# Patient Record
Sex: MALE | Race: BLACK OR AFRICAN AMERICAN | NOT HISPANIC OR LATINO | Employment: OTHER | URBAN - METROPOLITAN AREA
[De-identification: names, ages, dates, MRNs, and addresses within clinical notes are randomized per-mention and may not be internally consistent; named-entity substitution may affect disease eponyms.]

---

## 2019-08-11 ENCOUNTER — HOSPITAL ENCOUNTER (INPATIENT)
Facility: CLINIC | Age: 24
LOS: 2 days | Discharge: HOME | DRG: 885 | End: 2019-08-13
Attending: EMERGENCY MEDICINE | Admitting: PSYCHIATRY & NEUROLOGY
Payer: MEDICARE

## 2019-08-11 DIAGNOSIS — F12.10 MARIJUANA ABUSE: ICD-10-CM

## 2019-08-11 DIAGNOSIS — F25.0 SCHIZOAFFECTIVE DISORDER, BIPOLAR TYPE (H): ICD-10-CM

## 2019-08-11 DIAGNOSIS — F90.9 ATTENTION DEFICIT HYPERACTIVITY DISORDER (ADHD), UNSPECIFIED ADHD TYPE: ICD-10-CM

## 2019-08-11 DIAGNOSIS — R45.851 SUICIDAL IDEATION: ICD-10-CM

## 2019-08-11 DIAGNOSIS — F39 MILD MOOD DISORDER (H): ICD-10-CM

## 2019-08-11 DIAGNOSIS — F42.2 MIXED OBSESSIONAL THOUGHTS AND ACTS: ICD-10-CM

## 2019-08-11 DIAGNOSIS — F10.10 ALCOHOL ABUSE, CONTINUOUS: ICD-10-CM

## 2019-08-11 DIAGNOSIS — F29 ATYPICAL PSYCHOSIS (H): ICD-10-CM

## 2019-08-11 LAB
AMPHETAMINES UR QL SCN: NEGATIVE
BARBITURATES UR QL: NEGATIVE
BENZODIAZ UR QL: NEGATIVE
CANNABINOIDS UR QL SCN: POSITIVE
COCAINE UR QL: NEGATIVE
ETHANOL UR QL SCN: NEGATIVE
OPIATES UR QL SCN: NEGATIVE

## 2019-08-11 PROCEDURE — 99285 EMERGENCY DEPT VISIT HI MDM: CPT | Mod: Z6 | Performed by: EMERGENCY MEDICINE

## 2019-08-11 PROCEDURE — 99285 EMERGENCY DEPT VISIT HI MDM: CPT | Mod: 25 | Performed by: EMERGENCY MEDICINE

## 2019-08-11 PROCEDURE — 90791 PSYCH DIAGNOSTIC EVALUATION: CPT

## 2019-08-11 PROCEDURE — 80307 DRUG TEST PRSMV CHEM ANLYZR: CPT | Performed by: EMERGENCY MEDICINE

## 2019-08-11 PROCEDURE — 25000132 ZZH RX MED GY IP 250 OP 250 PS 637: Mod: GY | Performed by: NURSE PRACTITIONER

## 2019-08-11 PROCEDURE — 12400001 ZZH R&B MH UMMC

## 2019-08-11 PROCEDURE — 80320 DRUG SCREEN QUANTALCOHOLS: CPT | Performed by: EMERGENCY MEDICINE

## 2019-08-11 RX ORDER — OLANZAPINE 5 MG/1
5-10 TABLET ORAL 4 TIMES DAILY PRN
Status: DISCONTINUED | OUTPATIENT
Start: 2019-08-11 | End: 2019-08-13 | Stop reason: HOSPADM

## 2019-08-11 RX ORDER — ESCITALOPRAM OXALATE 20 MG/1
20 TABLET ORAL DAILY
Status: DISCONTINUED | OUTPATIENT
Start: 2019-08-12 | End: 2019-08-13 | Stop reason: HOSPADM

## 2019-08-11 RX ORDER — HYDROXYZINE HYDROCHLORIDE 25 MG/1
25-50 TABLET, FILM COATED ORAL EVERY 4 HOURS PRN
Status: DISCONTINUED | OUTPATIENT
Start: 2019-08-11 | End: 2019-08-13 | Stop reason: HOSPADM

## 2019-08-11 RX ORDER — ALUMINA, MAGNESIA, AND SIMETHICONE 2400; 2400; 240 MG/30ML; MG/30ML; MG/30ML
30 SUSPENSION ORAL EVERY 4 HOURS PRN
Status: DISCONTINUED | OUTPATIENT
Start: 2019-08-11 | End: 2019-08-13 | Stop reason: HOSPADM

## 2019-08-11 RX ORDER — LITHIUM CARBONATE 300 MG
900 TABLET ORAL AT BEDTIME
Status: ON HOLD | COMMUNITY
End: 2019-08-13

## 2019-08-11 RX ORDER — BENZTROPINE MESYLATE 0.5 MG/1
0.5 TABLET ORAL
COMMUNITY
End: 2019-08-20

## 2019-08-11 RX ORDER — BISACODYL 10 MG
10 SUPPOSITORY, RECTAL RECTAL DAILY PRN
Status: DISCONTINUED | OUTPATIENT
Start: 2019-08-11 | End: 2019-08-13 | Stop reason: HOSPADM

## 2019-08-11 RX ORDER — RISPERIDONE 3 MG/1
6 TABLET ORAL AT BEDTIME
COMMUNITY
End: 2024-01-06

## 2019-08-11 RX ORDER — BENZTROPINE MESYLATE 0.5 MG/1
0.5 TABLET ORAL 2 TIMES DAILY PRN
Status: DISCONTINUED | OUTPATIENT
Start: 2019-08-11 | End: 2019-08-13 | Stop reason: HOSPADM

## 2019-08-11 RX ORDER — ACETAMINOPHEN 325 MG/1
650 TABLET ORAL EVERY 4 HOURS PRN
Status: DISCONTINUED | OUTPATIENT
Start: 2019-08-11 | End: 2019-08-13 | Stop reason: HOSPADM

## 2019-08-11 RX ORDER — ESCITALOPRAM OXALATE 20 MG/1
20 TABLET ORAL DAILY
COMMUNITY
End: 2024-01-06

## 2019-08-11 RX ORDER — OLANZAPINE 2.5 MG/1
TABLET, FILM COATED ORAL
COMMUNITY
End: 2024-01-06

## 2019-08-11 RX ORDER — LITHIUM CARBONATE 300 MG/1
900 TABLET, FILM COATED, EXTENDED RELEASE ORAL AT BEDTIME
Status: DISCONTINUED | OUTPATIENT
Start: 2019-08-11 | End: 2019-08-12

## 2019-08-11 RX ORDER — BUPROPION HYDROCHLORIDE 200 MG/1
200 TABLET, EXTENDED RELEASE ORAL 2 TIMES DAILY
Status: ON HOLD | COMMUNITY
End: 2019-08-13

## 2019-08-11 RX ORDER — TRAZODONE HYDROCHLORIDE 50 MG/1
50 TABLET, FILM COATED ORAL
Status: DISCONTINUED | OUTPATIENT
Start: 2019-08-11 | End: 2019-08-13 | Stop reason: HOSPADM

## 2019-08-11 RX ORDER — RISPERIDONE 2 MG/1
6 TABLET ORAL AT BEDTIME
Status: DISCONTINUED | OUTPATIENT
Start: 2019-08-11 | End: 2019-08-13 | Stop reason: HOSPADM

## 2019-08-11 RX ADMIN — RISPERIDONE 6 MG: 2 TABLET ORAL at 20:29

## 2019-08-11 RX ADMIN — LITHIUM CARBONATE 900 MG: 300 TABLET, EXTENDED RELEASE ORAL at 20:28

## 2019-08-11 ASSESSMENT — ACTIVITIES OF DAILY LIVING (ADL)
BATHING: 0-->INDEPENDENT
COGNITION: 0 - NO COGNITION ISSUES REPORTED
DRESS: 0-->INDEPENDENT
RETIRED_COMMUNICATION: 0-->UNDERSTANDS/COMMUNICATES WITHOUT DIFFICULTY
RETIRED_EATING: 0-->INDEPENDENT
TOILETING: 0-->INDEPENDENT
AMBULATION: 0-->INDEPENDENT
SWALLOWING: 0-->SWALLOWS FOODS/LIQUIDS WITHOUT DIFFICULTY
TRANSFERRING: 0-->INDEPENDENT
FALL_HISTORY_WITHIN_LAST_SIX_MONTHS: NO

## 2019-08-11 ASSESSMENT — ENCOUNTER SYMPTOMS
DYSPHORIC MOOD: 1
SLEEP DISTURBANCE: 1
HALLUCINATIONS: 1

## 2019-08-11 NOTE — ED NOTES
Performed security screen. Removed items placed in security bin. Patient informed of video monitoring.

## 2019-08-11 NOTE — ED NOTES
ED to Behavioral Floor Handoff    SITUATION  Bertin Medina is a 24 year old male who speaks English and lives in a home with family members The patient arrived in the ED by private car from home with a complaint of Suicidal (SI with plan to stab self or slit throat. States writing music helps. ); Depression; and Insomnia  .The patient's current symptoms started/worsened 2 week(s) ago and during this time the symptoms have increased.   In the ED, pt was diagnosed with   Final diagnoses:   Schizoaffective disorder, bipolar type (H)   Suicidal ideation   Marijuana abuse        Initial vitals were: BP: 117/75  Pulse: 87  Temp: 98.2  F (36.8  C)  Resp: 18  Weight: 63.5 kg (140 lb)  SpO2: 100 %   --------  Is the patient diabetic? No   If yes, last blood glucose? --     If yes, was this treated in the ED? --  --------  Is the patient inebriated (ETOH) No or Impaired on other substances? No  MSSA done? N/A  Last MSSA score: --    Were withdrawal symptoms treated? N/A  Does the patient have a seizure history? No. If yes, date of most recent seizure--  --------  Is the patient patient experiencing suicidal ideation? SI    Homicidal ideation? denies current or recent homicidal ideation or behaviors.    Self-injurious behavior/urges? denies current or recent self injurious behavior or ideation.  ------  Was pt aggressive in the ED No  Was a code called No  Is the pt now cooperative? Yes  -------  Meds given in ED: Medications - No data to display   Family present during ED course? Yes  Family currently present? No    BACKGROUND  Does the patient have a cognitive impairment or developmental disability? No  Allergies: No Known Allergies.   Social demographics are   Social History     Socioeconomic History     Marital status: Single     Spouse name: None     Number of children: None     Years of education: None     Highest education level: None   Occupational History     None   Social Needs     Financial resource strain: None      Food insecurity:     Worry: None     Inability: None     Transportation needs:     Medical: None     Non-medical: None   Tobacco Use     Smoking status: Current Every Day Smoker     Packs/day: 0.25     Smokeless tobacco: Never Used   Substance and Sexual Activity     Alcohol use: Not Currently     Drug use: Yes     Types: Marijuana     Sexual activity: None   Lifestyle     Physical activity:     Days per week: None     Minutes per session: None     Stress: None   Relationships     Social connections:     Talks on phone: None     Gets together: None     Attends Anglican service: None     Active member of club or organization: None     Attends meetings of clubs or organizations: None     Relationship status: None     Intimate partner violence:     Fear of current or ex partner: None     Emotionally abused: None     Physically abused: None     Forced sexual activity: None   Other Topics Concern     None   Social History Narrative     None        ASSESSMENT  Labs results   Labs Ordered and Resulted from Time of ED Arrival Up to the Time of Departure from the ED   DRUG ABUSE SCREEN 6 CHEM DEP URINE ([facility]) - Abnormal; Notable for the following components:       Result Value    Cannabinoids Qual Urine Positive (*)     All other components within normal limits      Imaging Studies: No results found for this or any previous visit (from the past 24 hour(s)).   Most recent vital signs /75   Pulse 87   Temp 98.2  F (36.8  C) (Oral)   Resp 18   Wt 63.5 kg (140 lb)   SpO2 100%    Abnormal labs/tests/findings requiring intervention:---   Pain control: pt had none  Nausea control: pt had none    RECOMMENDATION  Are any infection precautions needed (MRSA, VRE, etc.)? No If yes, what infection? --  ---  Does the patient have mobility issues? independently. If yes, what device does the pt use? ---  ---  Is patient on 72 hour hold or commitment? No If on 72 hour hold, have hold and rights been given to patient? N/A  Are  admitting orders written if after 10 p.m. ?N/A  Tasks needing to be completed:---     Sydney romero--    [phone] [facility]   [phone] [facility]

## 2019-08-11 NOTE — ED PROVIDER NOTES
History     Chief Complaint   Patient presents with     Suicidal     SI with plan to stab self or slit throat. States writing music helps.      Depression     Insomnia     HPI  Bertin Medina is a 24 year old male with hx of schizoaffective disorder, bipolar type, adhd, ocd, odd who presents to the ED with adoptive mom and cousin due to worsening depression and suicidal thoughts now with a plan to cut his wrists.  He says he has been experiencing increasing depression over the past 10-14 years.  His sleep has been disruptive.  He has either been sleeping a lot or not at all.  He has been wandering the house.  He has been perseverating about his bio mom's death when he was 2.5 years old.  He has been under the care of his adoptive mom (aunt) since that time.  She is his POA but he is his own legal guardian.  He has a medication provider. He is compliant with meds.  He smokes thc and alcohol occasionally.  He drank last night.  He hears voices at baselines.  They are not command voices.  They put him down and make him feel more depressed.  He was admitted in [place] when he was 17 and 19.     I have reviewed the Medications, Allergies, Past Medical and Surgical History, and Social History in the Epic system.    Review of Systems   Psychiatric/Behavioral: Positive for dysphoric mood, hallucinations, sleep disturbance and suicidal ideas. Negative for self-injury.   All other systems reviewed and are negative.      Physical Exam   BP: 117/75  Pulse: 87  Temp: 98.2  F (36.8  C)  Resp: 18  Weight: 63.5 kg (140 lb)  SpO2: 100 %      Physical Exam   Constitutional: He is oriented to person, place, and time. He appears well-developed and well-nourished. No distress.   HENT:   Head: Normocephalic and atraumatic.   Right Ear: External ear normal.   Left Ear: External ear normal.   Nose: Nose normal.   Eyes: EOM are normal.   Neck: Normal range of motion.   Cardiovascular: Normal rate, regular rhythm and normal heart sounds.    Pulmonary/Chest: Effort normal and breath sounds normal.   Abdominal: Soft.   Musculoskeletal: Normal range of motion.   Neurological: He is alert and oriented to person, place, and time.   Skin: Skin is warm and dry. He is not diaphoretic.   Psychiatric: His speech is normal and behavior is normal. Judgment normal. He is not actively hallucinating. Cognition and memory are normal. He exhibits a depressed mood. He expresses suicidal ideation. He expresses suicidal plans. He is attentive.   Nursing note and vitals reviewed.      ED Course        Procedures           Labs Ordered and Resulted from Time of ED Arrival Up to the Time of Departure from the ED   DRUG ABUSE SCREEN 6 CHEM DEP URINE ([facility]) - Abnormal; Notable for the following components:       Result Value    Cannabinoids Qual Urine Positive (*)     All other components within normal limits            Assessments & Plan (with Medical Decision Making)   The patient has hx of schizoaffective disorder, bipolar type, adhd, ocd, odd who presents with family due to worsening depression with suicidal thoughts and plan.  He has been compliant with meds.  He has a med provider.  He was evaluated by myself and the DEC  and we feel he would benefit from inpatient admission. This is a voluntary admit.     I have reviewed the nursing notes.    I have reviewed the findings, diagnosis, plan and need for follow up with the patient.    New Prescriptions    No medications on file       Final diagnoses:   Schizoaffective disorder, bipolar type (H)   Suicidal ideation   Marijuana abuse       8/11/2019   [facility], [place], EMERGENCY DEPARTMENT     Lucía García MD  08/11/19 8516

## 2019-08-11 NOTE — PHARMACY-ADMISSION MEDICATION HISTORY
Admission medication history for the August 11, 2019 admission is complete.     Interview sources:  Patient, Dispense Hx    Medication adherence: Good - Patient's medications are managed by his Aunt.  Patient reports he occasionally forgets to take his morning medications when he oversleeps, approximately 2-3 times per month.     Current outpatient pharmacy:  [facility] - [place], [place]  [phone]    Pertinent Medication Changes:  Added: The patient is currently taking the following medications not previously listed:  Benztropine, Escitalopram, Lithium Carbonate, Olanzapine, Bupropion, Risperidone    Deleted: None.     Changed: None.     Additional medication history information:   This medication history was completed at the patients bedside in the Adult Emergency Department.  Patient reports no known food or medication allergies.  Attempted to contact patient's Aunt via phone to verify patient medications.  Aunt was unavailable to speak with pharmacy.  Patient's medication list verified per recent fill history with [facility] - [place], [place].      Per dispense history, the patient filled the following additional prescriptions in the last 30 days:   --Lorazepam 1 mg tablet --take 1 tablet by mouth daily as needed for anxiety.  (Dispensed 7/21/19 for a quantity of 6 tablets)  --Prochlorperazine 5 mg tablet --take 2 tablets by mouth every 6 hours as needed for nausea.  (Dispensed 7/18/19 for a quantity of 20 tablets)    The patient denies currently taking any additional prescription, OTC or herbal medications at home.       Prior to Admission Medications   Prescriptions Last Dose Informant Taking?   OLANZapine (ZYPREXA) 2.5 MG tablet Past Week at [facility] Yes   Sig: Take 1-2 tablets by mouth two times daily as needed   benztropine (COGENTIN) 0.5 MG tablet 8/10/2019 at [facility] Yes   Sig: Take 0.5 mg by mouth at bedtime and daily as needed   buPROPion (WELLBUTRIN SR) 200 MG 12 hr tablet 8/11/2019  at [facility] Yes   Sig: Take 200 mg by mouth 2 times daily   escitalopram (LEXAPRO) 20 MG tablet 8/11/2019 at [facility] Yes   Sig: Take 20 mg by mouth daily   lithium (ESKALITH) 300 MG tablet 8/10/2019 at [facility] Yes   Sig: Take 900 mg by mouth At Bedtime   risperiDONE (RISPERDAL) 3 MG tablet 8/10/2019 at [facility] Yes   Sig: Take 6 mg by mouth At Bedtime      Facility-Administered Medications: None       Time spent: 30 minutes    Medication history completed by:  Blanca Jennings, Pharmacy Intern

## 2019-08-12 LAB
ALBUMIN SERPL-MCNC: 3.7 G/DL (ref 3.4–5)
ALP SERPL-CCNC: 66 U/L (ref 40–150)
ALT SERPL W P-5'-P-CCNC: 25 U/L (ref 0–70)
ANION GAP SERPL CALCULATED.3IONS-SCNC: 1 MMOL/L (ref 3–14)
AST SERPL W P-5'-P-CCNC: 32 U/L (ref 0–45)
BASOPHILS # BLD AUTO: 0 10E9/L (ref 0–0.2)
BASOPHILS NFR BLD AUTO: 0.4 %
BILIRUB SERPL-MCNC: 0.5 MG/DL (ref 0.2–1.3)
BUN SERPL-MCNC: 12 MG/DL (ref 7–30)
CALCIUM SERPL-MCNC: 8.8 MG/DL (ref 8.5–10.1)
CHLORIDE SERPL-SCNC: 108 MMOL/L (ref 94–109)
CHOLEST SERPL-MCNC: 129 MG/DL
CO2 SERPL-SCNC: 30 MMOL/L (ref 20–32)
CREAT SERPL-MCNC: 1.32 MG/DL (ref 0.66–1.25)
DIFFERENTIAL METHOD BLD: ABNORMAL
EOSINOPHIL # BLD AUTO: 0.2 10E9/L (ref 0–0.7)
EOSINOPHIL NFR BLD AUTO: 4.6 %
ERYTHROCYTE [DISTWIDTH] IN BLOOD BY AUTOMATED COUNT: 14.8 % (ref 10–15)
GFR SERPL CREATININE-BSD FRML MDRD: 75 ML/MIN/{1.73_M2}
GLUCOSE SERPL-MCNC: 85 MG/DL (ref 70–99)
HCT VFR BLD AUTO: 39.3 % (ref 40–53)
HDLC SERPL-MCNC: 41 MG/DL
HGB BLD-MCNC: 12.7 G/DL (ref 13.3–17.7)
IMM GRANULOCYTES # BLD: 0 10E9/L (ref 0–0.4)
IMM GRANULOCYTES NFR BLD: 0.4 %
LDLC SERPL CALC-MCNC: 77 MG/DL
LYMPHOCYTES # BLD AUTO: 1.8 10E9/L (ref 0.8–5.3)
LYMPHOCYTES NFR BLD AUTO: 36.8 %
MCH RBC QN AUTO: 23.9 PG (ref 26.5–33)
MCHC RBC AUTO-ENTMCNC: 32.3 G/DL (ref 31.5–36.5)
MCV RBC AUTO: 74 FL (ref 78–100)
MONOCYTES # BLD AUTO: 0.6 10E9/L (ref 0–1.3)
MONOCYTES NFR BLD AUTO: 11.3 %
NEUTROPHILS # BLD AUTO: 2.3 10E9/L (ref 1.6–8.3)
NEUTROPHILS NFR BLD AUTO: 46.5 %
NONHDLC SERPL-MCNC: 88 MG/DL
NRBC # BLD AUTO: 0 10*3/UL
NRBC BLD AUTO-RTO: 0 /100
PLATELET # BLD AUTO: 242 10E9/L (ref 150–450)
POTASSIUM SERPL-SCNC: 4.5 MMOL/L (ref 3.4–5.3)
PROT SERPL-MCNC: 7 G/DL (ref 6.8–8.8)
RBC # BLD AUTO: 5.31 10E12/L (ref 4.4–5.9)
SODIUM SERPL-SCNC: 139 MMOL/L (ref 133–144)
TRIGL SERPL-MCNC: 57 MG/DL
TSH SERPL DL<=0.005 MIU/L-ACNC: 1.71 MU/L (ref 0.4–4)
WBC # BLD AUTO: 5 10E9/L (ref 4–11)

## 2019-08-12 PROCEDURE — 80061 LIPID PANEL: CPT | Performed by: NURSE PRACTITIONER

## 2019-08-12 PROCEDURE — 84443 ASSAY THYROID STIM HORMONE: CPT | Performed by: NURSE PRACTITIONER

## 2019-08-12 PROCEDURE — H2032 ACTIVITY THERAPY, PER 15 MIN: HCPCS

## 2019-08-12 PROCEDURE — 36415 COLL VENOUS BLD VENIPUNCTURE: CPT | Performed by: NURSE PRACTITIONER

## 2019-08-12 PROCEDURE — 85025 COMPLETE CBC W/AUTO DIFF WBC: CPT | Performed by: NURSE PRACTITIONER

## 2019-08-12 PROCEDURE — 99223 1ST HOSP IP/OBS HIGH 75: CPT | Mod: AI | Performed by: PSYCHIATRY & NEUROLOGY

## 2019-08-12 PROCEDURE — G0177 OPPS/PHP; TRAIN & EDUC SERV: HCPCS

## 2019-08-12 PROCEDURE — 12400001 ZZH R&B MH UMMC

## 2019-08-12 PROCEDURE — 25000132 ZZH RX MED GY IP 250 OP 250 PS 637: Mod: GY | Performed by: NURSE PRACTITIONER

## 2019-08-12 PROCEDURE — 80053 COMPREHEN METABOLIC PANEL: CPT | Performed by: NURSE PRACTITIONER

## 2019-08-12 RX ADMIN — RISPERIDONE 6 MG: 2 TABLET ORAL at 22:30

## 2019-08-12 RX ADMIN — ESCITALOPRAM OXALATE 20 MG: 20 TABLET ORAL at 08:29

## 2019-08-12 ASSESSMENT — ACTIVITIES OF DAILY LIVING (ADL)
HYGIENE/GROOMING: INDEPENDENT
DRESS: SCRUBS (BEHAVIORAL HEALTH)
ORAL_HYGIENE: INDEPENDENT
LAUNDRY: UNABLE TO COMPLETE

## 2019-08-12 NOTE — PROGRESS NOTES
Initial Psychosocial Assessment    I have reviewed the chart, met with the patient, and developed Care Plan.      Presenting Problem:  Pt was admitted to station [facility] due to an increase in SI and depression as a result of Auditory Hallucinations that pt describes as belittling and mean. Pt confirms that his voices impact his mood and cause his depressive symptoms to escalate over the past 2 weeks. Pt confirmed that his recent SI included a plan to cut his wrists. Pt's family expressed concern that over the past 3 days prior to admission, pt's presentation has become increasing depressed. Additionally, they noticed that his OCD has been escalating as well and that pt has not been sleeping during this time frame as well. Pt reports that he has voices at baseline and that more recently he has been thinking people have been talking about him, making him increasingly paranoid. Pt admits to occasionally using marijuana and alcohol, with the most recent use of marijuana being 2 weeks ago.    Pt is reported to have been compliant with medication, meeting with his outpatient psychiatrist as necessary, but outpatient psychiatrist has voiced concerns that pt has been decompensating. Additionally, pt was seeing a therapist but they left the practice, so he is waiting to seen the replacement therapist once they start. Pt is reported to have difficulty with assimilating new information.     History of Mental Health and Chemical Dependency:  Mental Health Hx:  Pt was diagnosed with bipolar at 17 and schizophrenia at 19, while living in [place].  Pt has a hx of Schizophrenia, Depression, ADHD, OCD, and ODD.  Pt has been hospitalized as an adolescent, this is his first IP stay as an adult.  Pt was in special ed. in school.    Chemical Dependency Hx:  Pt does not have a hx of CD trx or diagnosis.  Pt confirms he occasionally uses THC and Alcohol, but doesn't feel he is dependent.  Utox was positive for Cannabinoids     Family  Description (Constellation, Family Psychiatric History):  Pt reports family hx of schizophrenia     Significant Life Events (Illness, Abuse, Trauma, Death):  Pt's mother  when he was 2.5 and he was adopted by his aunt  Pt moved to [place] in     Living Situation:  Pt currently lives with his Aunt  Describes it as stable    Educational Background:  High school graduate  1 semester of college    Occupational History:  Currently unemployed  Most recent worked in a restaurant    Financial Status:  INCOME SOURCE: [employer]  INSURANCE: Medicare    Legal Issues:  Pt is voluntary  Denies legal issues    Ethnic/Cultural Issues:  Preferred Pronouns: Male    Spiritual Orientation:  None reported     Service History:  None    Current Treatment Providers are:  Therapist: Waiting for therapist at [facility]  Psychiatrist: [place] at [facility]  PCP: New provider at [facility]  Community supports/programs:  None at this time.    Social Service Assessment/Plan:  Patient has been admitted to station 10[facility] due to needing hospitalization for safety and stabilization. Patient will have psychiatric assessment and medication management by the psychiatrist. Medications will be reviewed and adjusted per MD as indicated. The treatment team will continue to assess and stabilize the patient's mental health symptoms with the use of medications and therapeutic programming. Hospital staff will provide a safe environment and promote a therapeutic milieu. Staff will continue to assess patient as needed, informing treatment team of changes in presentation and improved status. Patient will be encouraged to participate in unit groups and activities. Patient will receive individual and group support on the unit. CTC will do individual inpatient treatment planning and after care planning with the goal of successful community reintegration while reducing chances of recidivism. CTC will  discuss options for increasing community supports with the patient. CTC will coordinate with outpatient providers and will place referrals to ensure appropriate follow up care is in place as needed.

## 2019-08-12 NOTE — PLAN OF CARE
Admitted 23 yo male as voluntary pt from the ED. BIB aunt and cousin. Aunt (Johan Faustin) is his guardian, ph# [phone].. Pt had hx of schizoaffective d/o, bipolar, OCD, and ADHD. Has AH when he is at his baseline, voices that berate and belittle him. Past few weeks increased depression. Suicidal plan to cut his wrist.    Occasional alcohol and marijuana use. Utox positive for THC.  Spoke with pt's guardian and received verbal consent for treatment.  Pt cooperative with search, however refused to complete adm profile.  Medication compliant this evening. Wellbutrin not reordered due to possibly contributing to psychosis.    Placed on suicide precautions.

## 2019-08-12 NOTE — PROGRESS NOTES
Mom (aunt that adopted him, Johan Faustin) called to check on patient. She clarified that she is patient's POA (not guardian) and that she is able to sign patient in for hospital admission or that both she and patient sign him in together. Unable to determine this without copy of paperwork. Again asked that she bring her power of  paperwork in for a copy to be placed on the chart. She was given fax number for unit to fax it if that was more convenient. She agreed with this plan.   Patient signed consents for admission and also signed JORDYN for his aunt that he lives with (Johan).   Creatinine elevated at 1.32 and Dr. Dominguez informed. May stop lithium. Patient eating and drinking adequate amounts.

## 2019-08-12 NOTE — PLAN OF CARE
BEHAVIORAL TEAM DISCUSSION    Participants: Dr. Vic Dominguez MD, Sara Woodard RN, Fitz Dutta [facility]  Progress: New Admit  Continued Stay Criteria/Rationale: Evaluate and assess  Medical/Physical: Evaluate and assess  Precautions:   Behavioral Orders   Procedures    Code 1 - Restrict to Unit    Routine Programming     As clinically indicated    Status 15     Every 15 minutes.    Suicide precautions     Patients on Suicide Precautions should have a Combination Diet ordered that includes a Diet selection(s) AND a Behavioral Tray selection for Safe Tray - with utensils, or Safe Tray - NO utensils       Plan: Continue to evaluate and assess  Rationale for change in precautions or plan: None
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
## 2019-08-13 VITALS
TEMPERATURE: 96.7 F | SYSTOLIC BLOOD PRESSURE: 122 MMHG | WEIGHT: 146.5 LBS | OXYGEN SATURATION: 100 % | HEART RATE: 73 BPM | DIASTOLIC BLOOD PRESSURE: 75 MMHG | RESPIRATION RATE: 16 BRPM

## 2019-08-13 LAB
CREAT SERPL-MCNC: 1.35 MG/DL (ref 0.66–1.25)
GFR SERPL CREATININE-BSD FRML MDRD: 73 ML/MIN/{1.73_M2}

## 2019-08-13 PROCEDURE — 25000132 ZZH RX MED GY IP 250 OP 250 PS 637: Mod: GY | Performed by: NURSE PRACTITIONER

## 2019-08-13 PROCEDURE — 82565 ASSAY OF CREATININE: CPT | Performed by: PSYCHIATRY & NEUROLOGY

## 2019-08-13 PROCEDURE — 99239 HOSP IP/OBS DSCHRG MGMT >30: CPT | Performed by: PSYCHIATRY & NEUROLOGY

## 2019-08-13 PROCEDURE — 36415 COLL VENOUS BLD VENIPUNCTURE: CPT | Performed by: PSYCHIATRY & NEUROLOGY

## 2019-08-13 PROCEDURE — G0177 OPPS/PHP; TRAIN & EDUC SERV: HCPCS

## 2019-08-13 RX ADMIN — ESCITALOPRAM OXALATE 20 MG: 20 TABLET ORAL at 08:47

## 2019-08-13 ASSESSMENT — ACTIVITIES OF DAILY LIVING (ADL)
HYGIENE/GROOMING: INDEPENDENT
ORAL_HYGIENE: INDEPENDENT
LAUNDRY: WITH SUPERVISION
DRESS: STREET CLOTHES

## 2019-08-13 NOTE — DISCHARGE SUMMARY
Psychiatric Discharge Summary    Bertin Medina MRN# [id number]   Age: 24 year old YOB: 1995     Date of Admission:  8/11/2019  Date of Discharge:  8/13/2019  Admitting Physician:  Vic Dominguez MD  Discharge Physician:  Vic Dominguez MD         Event Leading to Hospitalization:   A 24-year-old man admitted with worsening depression and suicidal thoughts.        See Admission note by Vic Dominguez MD on 8/12/19 for additional details.          Diagnoses:     1.  Schizoaffective disorder, bipolar type.   2.  Attention deficit hyperactivity disorder.   3.  OCD.   4.  History of oppositional defiant disorder.   5.  Alcohol use disorder, mild.   6.  Cannabis use disorder, mild.   7.  Cigarette nicotine dependence, currently in withdrawal.   8.  Acute kidney insufficiency.          Labs:     Results for orders placed or performed during the hospital encounter of 08/11/19   Drug abuse screen 6 urine (tox)   Result Value Ref Range    Amphetamine Qual Urine Negative NEG^Negative    Barbiturates Qual Urine Negative NEG^Negative    Benzodiazepine Qual Urine Negative NEG^Negative    Cannabinoids Qual Urine Positive (A) NEG^Negative    Cocaine Qual Urine Negative NEG^Negative    Ethanol Qual Urine Negative NEG^Negative    Opiates Qualitative Urine Negative NEG^Negative   CBC with platelets differential   Result Value Ref Range    WBC 5.0 4.0 - 11.0 10e9/L    RBC Count 5.31 4.4 - 5.9 10e12/L    Hemoglobin 12.7 (L) 13.3 - 17.7 g/dL    Hematocrit 39.3 (L) 40.0 - 53.0 %    MCV 74 (L) 78 - 100 fl    MCH 23.9 (L) 26.5 - 33.0 pg    MCHC 32.3 31.5 - 36.5 g/dL    RDW 14.8 10.0 - 15.0 %    Platelet Count 242 150 - 450 10e9/L    Diff Method Automated Method     % Neutrophils 46.5 %    % Lymphocytes 36.8 %    % Monocytes 11.3 %    % Eosinophils 4.6 %    % Basophils 0.4 %    % Immature Granulocytes 0.4 %    Nucleated RBCs 0 0 /100    Absolute Neutrophil 2.3 1.6 - 8.3 10e9/L    Absolute Lymphocytes 1.8 0.8 - 5.3  10e9/L    Absolute Monocytes 0.6 0.0 - 1.3 10e9/L    Absolute Eosinophils 0.2 0.0 - 0.7 10e9/L    Absolute Basophils 0.0 0.0 - 0.2 10e9/L    Abs Immature Granulocytes 0.0 0 - 0.4 10e9/L    Absolute Nucleated RBC 0.0    Comprehensive metabolic panel   Result Value Ref Range    Sodium 139 133 - 144 mmol/L    Potassium 4.5 3.4 - 5.3 mmol/L    Chloride 108 94 - 109 mmol/L    Carbon Dioxide 30 20 - 32 mmol/L    Anion Gap 1 (L) 3 - 14 mmol/L    Glucose 85 70 - 99 mg/dL    Urea Nitrogen 12 7 - 30 mg/dL    Creatinine 1.32 (H) 0.66 - 1.25 mg/dL    GFR Estimate 75 >60 mL/min/[1.73_m2]    GFR Estimate If Black 87 >60 mL/min/[1.73_m2]    Calcium 8.8 8.5 - 10.1 mg/dL    Bilirubin Total 0.5 0.2 - 1.3 mg/dL    Albumin 3.7 3.4 - 5.0 g/dL    Protein Total 7.0 6.8 - 8.8 g/dL    Alkaline Phosphatase 66 40 - 150 U/L    ALT 25 0 - 70 U/L    AST 32 0 - 45 U/L   TSH with free T4 reflex   Result Value Ref Range    TSH 1.71 0.40 - 4.00 mU/L   Lipid panel reflex to direct LDL   Result Value Ref Range    Cholesterol 129 <200 mg/dL    Triglycerides 57 <150 mg/dL    HDL Cholesterol 41 >39 mg/dL    LDL Cholesterol Calculated 77 <100 mg/dL    Non HDL Cholesterol 88 <130 mg/dL   Creatinine   Result Value Ref Range    Creatinine 1.35 (H) 0.66 - 1.25 mg/dL    GFR Estimate 73 >60 mL/min/[1.73_m2]    GFR Estimate If Black 85 >60 mL/min/[1.73_m2]              Consults:   No consultations were requested during this admission         Hospital Course:   Bertin Medina was admitted to Station 10 with attending Vic Dominguez MD as a voluntary patient. The patient was placed under status 15 (15 minute checks) to ensure patient safety.     The on call provider held the bupropion due to increase in manic and psychotic symptoms prior to admission. Due to elevated creatinine, I stopped the patient's lithium. He will need to follow-up as an outpatient to get repeat bloodwork to see if this has improved.    In the hospital, the patient was actually quite  happy. He slept well. He did not appear manic. He was not showing aggression or any signs he was suicidal. He also was not out of control or displaying any of the OCD symptoms his family was reporting prior to admission. It is possible that in a controlled hospital environment, his symptoms are less intense. At this time, it seems most appropriate for him to be managed outside of the hospital in an outpatient setting.    Bertin Medina was released to home. At the time of discharge Bertin Medina was determined to not be a danger to himself or others.          Discharge Medications:     Current Discharge Medication List      CONTINUE these medications which have NOT CHANGED    Details   benztropine (COGENTIN) 0.5 MG tablet Take 0.5 mg by mouth at bedtime and daily as needed      escitalopram (LEXAPRO) 20 MG tablet Take 20 mg by mouth daily      OLANZapine (ZYPREXA) 2.5 MG tablet Take 1-2 tablets by mouth two times daily as needed      risperiDONE (RISPERDAL) 3 MG tablet Take 6 mg by mouth At Bedtime         STOP taking these medications       buPROPion (WELLBUTRIN SR) 200 MG 12 hr tablet Comments:   Reason for Stopping:         lithium (ESKALITH) 300 MG tablet Comments:   Reason for Stopping:                    Psychiatric Examination:   Appearance:  awake, alert, adequately groomed and dressed in hospital scrubs  Attitude:  cooperative  Eye Contact:  good  Mood:  good  Affect:  appropriate and in normal range and mood congruent  Speech:  clear, coherent and normal prosody  Psychomotor Behavior:  no evidence of tardive dyskinesia, dystonia, or tics  Thought Process:  linear and goal oriented  Associations:  no loose associations  Thought Content:  no evidence of suicidal ideation or homicidal ideation and no evidence of psychotic thought  Insight:  fair  Judgment:  intact  Oriented to:  time, person, and place  Attention Span and Concentration:  intact  Recent and Remote Memory:  intact  Language: Able to name objects,  Able to repeat phrases and Able to read and write  Fund of Knowledge: low-normal  Muscle Strength and Tone: normal  Gait and Station: Normal         Discharge Plan:   Discharge to home with family.    From AVS:  Health Care Follow-up Appointments:   As discussed with your trx team you will need to follow up with your clinic [facility] to set up your aftercare therapy appointment. Their information has been provided below. Additionally, you have been referred to [facility] for Case Manage, their contact info has been provided below as well. You should receive a call from [facility] within 2 weeks.    [facility] Contact Info:  [phone] and request Adult's Case Management Services    [facility] [address], [place] [address]   Phone: [phone]   Fax: [phone]    Attend all scheduled appointments with your outpatient providers. Call at least 24 hours in advance if you need to reschedule an appointment to ensure continued access to your outpatient providers.         Attestation:  The patient has been seen and evaluated by me,  Vic Dominguez MD   On the day of discharge, I saw the patient and performed the above examination, reviewed discharge medications, reviewed follow-up plan, and assessed safety for discharge. I spent greater than 30 minutes on these tasks.

## 2019-08-13 NOTE — PROGRESS NOTES
08/13/19 1019   General Information   Date Initially Attended OT 08/12/19     Groups Attended: OT Clinic, Mental Health Management     Affect/Hygiene/Presentation: Calm/pleasant mood, engaged, bright affect. No apparent hygiene concerns.     Patient Performance/Response:  -Clinic: Pt actively participated in occupational therapy clinic. Pt was able to ask for assistance as needed, and independently initiate a familiar, goal-directed task without difficulty. Pt demonstrated good focus, planning, and attention to detail during task completion. Pt appeared comfortable interacting with peers and writer, and engaged in appropriate group conversation throughout clinic.   -Mental Health Management: Pt actively participated in a mental health management group with a focus on coping through movement to facilitate relaxation and stress management via chair yoga. Pt followed and engaged in the yoga poses, and verbalized feeling calmer at the end of group. Pt contributed at least one idea to a discussion at the end of group regarding the benefits of exercise, stretching, deep breathing, and meditation.     Plan: More information needed to complete OT Initial Assessment; OT staff will meet with pt to review the role of occupational therapy and explain the value of having them involved in their treatment plan including options to meet current needs/self-identified goals. As group attendance is established, Pt will be given self-assessment to inform OT initial assessment.

## 2019-08-13 NOTE — PROGRESS NOTES
KASSIE (writer) met with pt to complete his psychosocial assessment and discuss aftercare. Pt was pleasant and cooperative during interview. Pt requested assistance with setting up therapy at his current clinic he goes to as his current therapist has left that practice. Otherwise pt reported feeling better, doesn't expect to be in the hospital long, and doesn't feel he needs any other aftercare appointments set up.

## 2019-08-13 NOTE — PROGRESS NOTES
Pt was visible in the milieu for most of the shift. He attended all offered groups and socialized with peers in the lounge.  Affect was very neutral. Concentration could be a little better he stated. Pt denies all SI/SIB/HA/HI and all mental health symptoms. Pt reported he would like to stay medication compliant.        08/12/19 1500   Behavioral Health   Hallucinations denies / not responding to hallucinations   Thinking intact   Orientation time: oriented;date: oriented;place: oriented;person: oriented   Memory baseline memory   Insight insight appropriate to situation;insight appropriate to events   Judgement intact   Eye Contact at examiner   Affect full range affect   Mood mood is calm   Physical Appearance/Attire attire appropriate to age and situation   Hygiene well groomed   1. Wish to be Dead No   2. Non-Specific Active Suicidal Thoughts  No   Speech clear   Medication Sensitivity no stated side effects   Activities of Daily Living   Hygiene/Grooming independent   Oral Hygiene independent   Dress scrubs (behavioral health)   Laundry unable to complete   Room Organization independent

## 2019-08-13 NOTE — PROGRESS NOTES
Pt was visible in the milieu all though evening the shift. He socialized with peers in the lounge watching movies and playing games.  Affect was very neutral. Pt stated his concentration is getting better. Pt denies all SI/SIB/HA/HI and all mental health symptoms. Pt Stated his goal for the day was to take care of himself in order to leave here on time. Pt has no concerns at this time.       08/12/19 2011   Behavioral Health   Hallucinations denies / not responding to hallucinations   Thinking poor concentration   Orientation time: oriented;date: oriented;place: oriented;person: oriented   Memory baseline memory   Insight insight appropriate to situation;insight appropriate to events   Judgement intact   Eye Contact at examiner   Affect full range affect   Mood mood is calm   Physical Appearance/Attire attire appropriate to age and situation   Hygiene well groomed   1. Wish to be Dead No   2. Non-Specific Active Suicidal Thoughts  No   Speech clear   Medication Sensitivity no observed side effects;no stated side effects

## 2019-08-13 NOTE — PLAN OF CARE
Patient discharged to home, discharge instructions and medications explained to patient with no question asked.  Patient verbalized understanding of the discharge instructions.  Belongings sent with patient upon discharge.

## 2019-08-13 NOTE — PROGRESS NOTES
08/12/19 2200   Therapeutic Recreation   Type of Intervention structured groups   Activity game   Response Participates, initiates socially appropriate   Hours 1     Pt participated in Therapeutic Recreation group with focus on stress reduction, creative expression, and leisure participation. Engaged and cooperative in group recreational group via a group drawing game. Pt participated throughout entire duration of the group. Pt added to group discussion, sociable, and was appropriate with interactions. Showed progress in session goals. Pt mood was calm. Appropriately shared sense of humor with peers during groups, and appeared to brighten with social interaction.

## 2019-08-13 NOTE — PROGRESS NOTES
08/13/19 1533   Occupational Therapy   Type of Intervention structured groups   Response Initiates, socially acceptable   Hours 2

## 2019-08-20 ENCOUNTER — TRANSFERRED RECORDS (OUTPATIENT)
Dept: HEALTH INFORMATION MANAGEMENT | Facility: CLINIC | Age: 24
End: 2019-08-20

## 2019-08-20 ENCOUNTER — HOSPITAL ENCOUNTER (EMERGENCY)
Facility: CLINIC | Age: 24
Discharge: PSYCHIATRIC OR SUBSTANCE USE FACILITY | End: 2019-08-20
Attending: EMERGENCY MEDICINE | Admitting: EMERGENCY MEDICINE
Payer: MEDICARE

## 2019-08-20 ENCOUNTER — HOSPITAL ENCOUNTER (INPATIENT)
Facility: CLINIC | Age: 24
LOS: 7 days | Discharge: HOME | DRG: 885 | End: 2019-08-27
Attending: PSYCHIATRY & NEUROLOGY | Admitting: PSYCHIATRY & NEUROLOGY
Payer: MEDICARE

## 2019-08-20 VITALS
BODY MASS INDEX: 20.76 KG/M2 | WEIGHT: 145 LBS | TEMPERATURE: 99.2 F | SYSTOLIC BLOOD PRESSURE: 118 MMHG | DIASTOLIC BLOOD PRESSURE: 72 MMHG | HEIGHT: 70 IN | OXYGEN SATURATION: 100 % | RESPIRATION RATE: 14 BRPM

## 2019-08-20 DIAGNOSIS — F41.9 ANXIETY: Primary | ICD-10-CM

## 2019-08-20 DIAGNOSIS — R11.2 NAUSEA AND VOMITING, INTRACTABILITY OF VOMITING NOT SPECIFIED, UNSPECIFIED VOMITING TYPE: ICD-10-CM

## 2019-08-20 DIAGNOSIS — R45.851 SUICIDAL IDEATION: ICD-10-CM

## 2019-08-20 DIAGNOSIS — F25.0 SCHIZOAFFECTIVE DISORDER, BIPOLAR TYPE (H): ICD-10-CM

## 2019-08-20 LAB
ALBUMIN SERPL-MCNC: 3.5 G/DL (ref 3.4–5)
ALP SERPL-CCNC: 61 U/L (ref 40–150)
ALT SERPL W P-5'-P-CCNC: 21 U/L (ref 0–70)
AMPHETAMINES UR QL SCN: NEGATIVE
ANION GAP SERPL CALCULATED.3IONS-SCNC: 3 MMOL/L (ref 3–14)
AST SERPL W P-5'-P-CCNC: 31 U/L (ref 0–45)
BARBITURATES UR QL: NEGATIVE
BASOPHILS # BLD AUTO: 0 10E9/L (ref 0–0.2)
BASOPHILS NFR BLD AUTO: 0.4 %
BENZODIAZ UR QL: NEGATIVE
BILIRUB SERPL-MCNC: 0.1 MG/DL (ref 0.2–1.3)
BUN SERPL-MCNC: 17 MG/DL (ref 7–30)
CALCIUM SERPL-MCNC: 8.8 MG/DL (ref 8.5–10.1)
CANNABINOIDS UR QL SCN: POSITIVE
CHLORIDE SERPL-SCNC: 108 MMOL/L (ref 94–109)
CO2 SERPL-SCNC: 28 MMOL/L (ref 20–32)
COCAINE UR QL: POSITIVE
CREAT SERPL-MCNC: 1.35 MG/DL (ref 0.66–1.25)
DIFFERENTIAL METHOD BLD: ABNORMAL
EOSINOPHIL # BLD AUTO: 0.4 10E9/L (ref 0–0.7)
EOSINOPHIL NFR BLD AUTO: 5 %
ERYTHROCYTE [DISTWIDTH] IN BLOOD BY AUTOMATED COUNT: 15.9 % (ref 10–15)
GFR SERPL CREATININE-BSD FRML MDRD: 73 ML/MIN/{1.73_M2}
GLUCOSE SERPL-MCNC: 92 MG/DL (ref 70–99)
HCT VFR BLD AUTO: 37.1 % (ref 40–53)
HGB BLD-MCNC: 11.7 G/DL (ref 13.3–17.7)
IMM GRANULOCYTES # BLD: 0 10E9/L (ref 0–0.4)
IMM GRANULOCYTES NFR BLD: 0.6 %
LITHIUM SERPL-SCNC: 0.55 MMOL/L (ref 0.6–1.2)
LYMPHOCYTES # BLD AUTO: 1.8 10E9/L (ref 0.8–5.3)
LYMPHOCYTES NFR BLD AUTO: 25.3 %
MCH RBC QN AUTO: 24 PG (ref 26.5–33)
MCHC RBC AUTO-ENTMCNC: 31.5 G/DL (ref 31.5–36.5)
MCV RBC AUTO: 76 FL (ref 78–100)
MONOCYTES # BLD AUTO: 0.6 10E9/L (ref 0–1.3)
MONOCYTES NFR BLD AUTO: 8.9 %
NEUTROPHILS # BLD AUTO: 4.3 10E9/L (ref 1.6–8.3)
NEUTROPHILS NFR BLD AUTO: 59.8 %
NRBC # BLD AUTO: 0 10*3/UL
NRBC BLD AUTO-RTO: 0 /100
OPIATES UR QL SCN: NEGATIVE
PCP UR QL SCN: NEGATIVE
PLATELET # BLD AUTO: 242 10E9/L (ref 150–450)
POTASSIUM SERPL-SCNC: 4.1 MMOL/L (ref 3.4–5.3)
PROT SERPL-MCNC: 6.7 G/DL (ref 6.8–8.8)
RBC # BLD AUTO: 4.88 10E12/L (ref 4.4–5.9)
SODIUM SERPL-SCNC: 139 MMOL/L (ref 133–144)
WBC # BLD AUTO: 7.2 10E9/L (ref 4–11)

## 2019-08-20 PROCEDURE — 12400001 ZZH R&B MH UMMC

## 2019-08-20 PROCEDURE — 99285 EMERGENCY DEPT VISIT HI MDM: CPT | Mod: 25

## 2019-08-20 PROCEDURE — 80178 ASSAY OF LITHIUM: CPT | Performed by: EMERGENCY MEDICINE

## 2019-08-20 PROCEDURE — 80307 DRUG TEST PRSMV CHEM ANLYZR: CPT | Performed by: EMERGENCY MEDICINE

## 2019-08-20 PROCEDURE — 85025 COMPLETE CBC W/AUTO DIFF WBC: CPT | Performed by: EMERGENCY MEDICINE

## 2019-08-20 PROCEDURE — 80053 COMPREHEN METABOLIC PANEL: CPT | Performed by: EMERGENCY MEDICINE

## 2019-08-20 PROCEDURE — 25000132 ZZH RX MED GY IP 250 OP 250 PS 637: Mod: GY | Performed by: PSYCHIATRY & NEUROLOGY

## 2019-08-20 PROCEDURE — 25000132 ZZH RX MED GY IP 250 OP 250 PS 637: Mod: GY | Performed by: EMERGENCY MEDICINE

## 2019-08-20 PROCEDURE — 90791 PSYCH DIAGNOSTIC EVALUATION: CPT

## 2019-08-20 RX ORDER — HYDROXYZINE HYDROCHLORIDE 25 MG/1
25 TABLET, FILM COATED ORAL EVERY 4 HOURS PRN
Status: DISCONTINUED | OUTPATIENT
Start: 2019-08-20 | End: 2019-08-25

## 2019-08-20 RX ORDER — BENZTROPINE MESYLATE 0.5 MG/1
0.5 TABLET ORAL AT BEDTIME
COMMUNITY
End: 2024-01-06

## 2019-08-20 RX ORDER — ESCITALOPRAM OXALATE 20 MG/1
20 TABLET ORAL DAILY
Status: DISCONTINUED | OUTPATIENT
Start: 2019-08-21 | End: 2019-08-27 | Stop reason: HOSPADM

## 2019-08-20 RX ORDER — OLANZAPINE 10 MG/2ML
5-10 INJECTION, POWDER, FOR SOLUTION INTRAMUSCULAR
Status: DISCONTINUED | OUTPATIENT
Start: 2019-08-20 | End: 2019-08-21

## 2019-08-20 RX ORDER — BENZTROPINE MESYLATE 0.5 MG/1
0.5 TABLET ORAL DAILY PRN
Status: DISCONTINUED | OUTPATIENT
Start: 2019-08-20 | End: 2019-08-27 | Stop reason: HOSPADM

## 2019-08-20 RX ORDER — ALUMINA, MAGNESIA, AND SIMETHICONE 2400; 2400; 240 MG/30ML; MG/30ML; MG/30ML
30 SUSPENSION ORAL EVERY 4 HOURS PRN
Status: DISCONTINUED | OUTPATIENT
Start: 2019-08-20 | End: 2019-08-27 | Stop reason: HOSPADM

## 2019-08-20 RX ORDER — BISACODYL 10 MG
10 SUPPOSITORY, RECTAL RECTAL DAILY PRN
Status: DISCONTINUED | OUTPATIENT
Start: 2019-08-20 | End: 2019-08-27 | Stop reason: HOSPADM

## 2019-08-20 RX ORDER — BENZTROPINE MESYLATE 0.5 MG/1
0.5 TABLET ORAL AT BEDTIME
Status: DISCONTINUED | OUTPATIENT
Start: 2019-08-20 | End: 2019-08-27 | Stop reason: HOSPADM

## 2019-08-20 RX ORDER — RISPERIDONE 3 MG/1
6 TABLET ORAL AT BEDTIME
Status: DISCONTINUED | OUTPATIENT
Start: 2019-08-20 | End: 2019-08-27 | Stop reason: HOSPADM

## 2019-08-20 RX ORDER — OLANZAPINE 5 MG/1
5 TABLET ORAL ONCE
Status: COMPLETED | OUTPATIENT
Start: 2019-08-20 | End: 2019-08-20

## 2019-08-20 RX ORDER — OLANZAPINE 2.5 MG/1
2.5-5 TABLET, FILM COATED ORAL
Status: DISCONTINUED | OUTPATIENT
Start: 2019-08-20 | End: 2019-08-21

## 2019-08-20 RX ORDER — TRAZODONE HYDROCHLORIDE 50 MG/1
50 TABLET, FILM COATED ORAL
Status: DISCONTINUED | OUTPATIENT
Start: 2019-08-20 | End: 2019-08-27 | Stop reason: HOSPADM

## 2019-08-20 RX ORDER — BENZTROPINE MESYLATE 0.5 MG/1
0.5 TABLET ORAL DAILY PRN
COMMUNITY
End: 2024-01-06

## 2019-08-20 RX ORDER — ACETAMINOPHEN 325 MG/1
650 TABLET ORAL EVERY 4 HOURS PRN
Status: DISCONTINUED | OUTPATIENT
Start: 2019-08-20 | End: 2019-08-27 | Stop reason: HOSPADM

## 2019-08-20 RX ADMIN — OLANZAPINE 5 MG: 5 TABLET, FILM COATED ORAL at 17:24

## 2019-08-20 RX ADMIN — BENZTROPINE MESYLATE 0.5 MG: 0.5 TABLET ORAL at 22:17

## 2019-08-20 RX ADMIN — RISPERIDONE 6 MG: 3 TABLET ORAL at 22:17

## 2019-08-20 ASSESSMENT — ACTIVITIES OF DAILY LIVING (ADL)
TOILETING: 0-->INDEPENDENT
COGNITION: 0 - NO COGNITION ISSUES REPORTED
DRESS: 0-->INDEPENDENT
RETIRED_EATING: 0-->INDEPENDENT
SWALLOWING: 0-->SWALLOWS FOODS/LIQUIDS WITHOUT DIFFICULTY
AMBULATION: 0-->INDEPENDENT
FALL_HISTORY_WITHIN_LAST_SIX_MONTHS: NO
RETIRED_COMMUNICATION: 0-->UNDERSTANDS/COMMUNICATES WITHOUT DIFFICULTY
TRANSFERRING: 0-->INDEPENDENT
BATHING: 0-->INDEPENDENT

## 2019-08-20 ASSESSMENT — MIFFLIN-ST. JEOR
SCORE: 1700.25
SCORE: 1658.97

## 2019-08-20 ASSESSMENT — ENCOUNTER SYMPTOMS: AGITATION: 1
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
## 2019-08-20 NOTE — ED PROVIDER NOTES
"  History     Chief Complaint:  Suicidal    HPI   Bertin Medina is a 24 year old male who presents to the emergency department today with suicidal ideation.  He presents with his aunt who helps provide history.  Symptoms have been worsening in the last 2 weeks and have been persisting despite outpatient treatment and medication adherence.  Patient does endorse some auditory hallucinations.  Patient has plans to harm himself with a gun.  There apparently is no access to a gun, but patient states that he will find some way to harm himself.  Denies any medication ingestions.  He presents with a note from his psychiatrist recommending decreasing his lithium dosing gradually over the next several days.  This is scanned into the chart.  Patient's aunt notes that he has been pacing, but is not violent.    Allergies:  No Known Drug Allergies     Medications:    Cogentin  Lexapro  Zyprexa  Risperdal   Ativan  Lexapro  Wellbutrin  Compazine      Past Medical History:    Bipolar 1 disorder  Depression  Schizo affective schizophrenia     Past Surgical History:    History reviewed. No pertinent past surgical history.    Family History:    History reviewed. No pertinent family history.     Social History:  The patient was accompanied to the ED by aunt.  Smoking Status: Current every day smoker  Smokeless Tobacco: Never  Alcohol Use: Not currently   Drugs: Marijuana    Marital Status:  Single      Review of Systems   Skin: Negative for wound.   Psychiatric/Behavioral: Positive for agitation and suicidal ideas.   All other systems reviewed and are negative.      Physical Exam     Patient Vitals for the past 24 hrs:   BP Temp Temp src Heart Rate Resp SpO2 Height Weight   08/20/19 1946 118/72 -- -- 64 14 100 % -- --   08/20/19 1813 122/76 99.2  F (37.3  C) Oral 65 14 99 % -- --   08/20/19 1540 107/65 98.1  F (36.7  C) Temporal 71 16 100 % 1.778 m (5' 10\") 65.8 kg (145 lb)      Physical Exam  Nursing note and vitals " reviewed.  General: Patient is alert and interactive when I enter the room  Head:  The scalp, face, and head appear normal  Eyes:  Conjunctivae are normal  ENT:    The nose is normal    Pinnae are normal  Neck:  Trachea midline  CV:  Normal rate  Resp:  No respiratory distress   Musc:  Normal muscular tone  Skin:  No rash or lesions noted  Neuro: Speech is normal and fluent. Face is symmetric. Moving all extremities well.   Psych:  Depressed mood, congruent affect.      Emergency Department Course   Laboratory:  Laboratory findings were communicated with the patient who voiced understanding of the findings.  CMP: 0.1 Bilirubin total, 6.7 protein total o/w WNL (Creatinine 1.35)   CBC: AWNL (WBC 7.2, HGB 11.7, )   Lithium level: 0.55  Drug abuse screen: positive for cannabinoids and cocaine      Interventions:  1724: Zyprexa 5 mg PO     Impression & Plan    Medical Decision Making:  Bertin Medina is a 24 year old male who presents with suicidal ideation.  Patient with some history of similar symptoms.  No obvious attempts made today.  Labs unremarkable.  Lithium level not elevated and is consistent with psychiatrist's plan of weaning him off of it.  Patient is voluntary for mental health transfer and DEC agrees with admission.  He is accepted at [facility] and is transferred in stable condition.  He and his aunt are in agreement this plan.    Diagnosis:    ICD-10-CM    1. Suicidal ideation R45.851        Disposition:  Transferred to [facility].     Scribe Disclosure:  Kim VILLARREAL am serving as a scribe at 4:24 PM on 8/20/2019 to document services personally performed by Jayme Ng MD based on my observations and the provider's statements to me.    8/20/2019   [facility] EMERGENCY DEPARTMENT       Jayme Ng MD  08/21/19 0152

## 2019-08-20 NOTE — ED NOTES
Pt resting, calm. Watching tv, visiting with his guardian. Had dinner and used restroom.  Fluids, warm blankets offered. Denies any needs at this time.

## 2019-08-21 LAB
CHOLEST SERPL-MCNC: 116 MG/DL
HDLC SERPL-MCNC: 41 MG/DL
LDLC SERPL CALC-MCNC: 63 MG/DL
NONHDLC SERPL-MCNC: 75 MG/DL
TRIGL SERPL-MCNC: 59 MG/DL
TSH SERPL DL<=0.005 MIU/L-ACNC: 1.62 MU/L (ref 0.4–4)

## 2019-08-21 PROCEDURE — 99223 1ST HOSP IP/OBS HIGH 75: CPT | Mod: AI | Performed by: CLINICAL NURSE SPECIALIST

## 2019-08-21 PROCEDURE — 25000132 ZZH RX MED GY IP 250 OP 250 PS 637: Mod: GY | Performed by: CLINICAL NURSE SPECIALIST

## 2019-08-21 PROCEDURE — 80061 LIPID PANEL: CPT | Performed by: PSYCHIATRY & NEUROLOGY

## 2019-08-21 PROCEDURE — 12400001 ZZH R&B MH UMMC

## 2019-08-21 PROCEDURE — 25000132 ZZH RX MED GY IP 250 OP 250 PS 637: Mod: GY | Performed by: PSYCHIATRY & NEUROLOGY

## 2019-08-21 PROCEDURE — 84443 ASSAY THYROID STIM HORMONE: CPT | Performed by: PSYCHIATRY & NEUROLOGY

## 2019-08-21 PROCEDURE — 36416 COLLJ CAPILLARY BLOOD SPEC: CPT | Performed by: PSYCHIATRY & NEUROLOGY

## 2019-08-21 PROCEDURE — 99207 ZZC CDG-MDM COMPONENT: MEETS HIGH - UP CODED: CPT | Performed by: CLINICAL NURSE SPECIALIST

## 2019-08-21 RX ORDER — OLANZAPINE 5 MG/1
5-10 TABLET ORAL
Status: DISCONTINUED | OUTPATIENT
Start: 2019-08-21 | End: 2019-08-27 | Stop reason: HOSPADM

## 2019-08-21 RX ORDER — LAMOTRIGINE 25 MG/1
25 TABLET ORAL DAILY
Status: DISCONTINUED | OUTPATIENT
Start: 2019-08-21 | End: 2019-08-27 | Stop reason: HOSPADM

## 2019-08-21 RX ORDER — OLANZAPINE 10 MG/2ML
5-10 INJECTION, POWDER, FOR SOLUTION INTRAMUSCULAR
Status: DISCONTINUED | OUTPATIENT
Start: 2019-08-21 | End: 2019-08-27 | Stop reason: HOSPADM

## 2019-08-21 RX ADMIN — LAMOTRIGINE 25 MG: 25 TABLET ORAL at 15:32

## 2019-08-21 RX ADMIN — ESCITALOPRAM OXALATE 20 MG: 20 TABLET, FILM COATED ORAL at 09:20

## 2019-08-21 RX ADMIN — BENZTROPINE MESYLATE 0.5 MG: 0.5 TABLET ORAL at 21:45

## 2019-08-21 RX ADMIN — RISPERIDONE 6 MG: 3 TABLET ORAL at 21:45

## 2019-08-21 ASSESSMENT — ENCOUNTER SYMPTOMS: WOUND: 0

## 2019-08-21 ASSESSMENT — ACTIVITIES OF DAILY LIVING (ADL)
ORAL_HYGIENE: INDEPENDENT
DRESS: SCRUBS (BEHAVIORAL HEALTH)
HYGIENE/GROOMING: INDEPENDENT
LAUNDRY: WITH SUPERVISION

## 2019-08-21 NOTE — PROGRESS NOTES
Per notes from previous intake pt had been referred to [facility] for Case Management. [facility] Services Contact Info: [phone] and request Adult's Case Management Services

## 2019-08-21 NOTE — DISCHARGE INSTRUCTIONS
Behavioral Discharge Planning and Instructions      Summary:  You were admitted on 8/11/2019  due to Depression and Suicidal Ideations.  You were treated by Dr. Vic Dominguez MD and discharged on 08/13/19 from [facility] to Home      Principal Diagnosis:   1.  Schizoaffective disorder, bipolar type.   2.  Attention deficit hyperactivity disorder.   3.  OCD.   4.  History of oppositional defiant disorder.   5.  Alcohol use disorder, mild.   6.  Cannabis use disorder, mild.   7.  Cigarette nicotine dependence, currently in withdrawal.   8.  Acute kidney insufficiency.     Health Care Follow-up Appointments:   As discussed with your trx team you will need to follow up with your clinic [facility] to set up your aftercare therapy appointment. Their information has been provided below. Additionally, you have been referred to [facility] for Case Manage, their contact info has been provided below as well. You should receive a call from [facility] [place] within 2 weeks.  [facility] [place] Contact Info:  [phone] and request Adult's Case Management Services  [facility] [address], [place] [address]   Phone: [phone]   Fax: [phone]  Attend all scheduled appointments with your outpatient providers. Call at least 24 hours in advance if you need to reschedule an appointment to ensure continued access to your outpatient providers.   Major Treatments, Procedures and Findings:  You were provided with: a psychiatric assessment, assessed for medical stability, medication evaluation and/or management, group therapy and milieu management    Symptoms to Report: feeling more aggressive, mood getting worse or thoughts of suicide    Early warning signs can include: increased depression or anxiety increased thoughts or behaviors of suicide or self-harm or Increased voices or increased OCD symptoms    Safety and Wellness:  Take all  medicines as directed.  Make no changes unless your doctor suggests them.      Follow treatment recommendations.  Refrain from alcohol and non-prescribed drugs.  If there is a concern for safety, call 911.    Resources:   Crisis Intervention: 572.333.7207 or 845-580-4000 (TTY: 417.708.6248).  Call anytime for help.  National [place] [employer] Mental Illness (www.mn.seferino.org): 294.215.7728 or 512-201-4875.  National Suicide Prevention Line (www.mentalhealthmn.org): 653-107-PMOM (9317)  Mental Health Association of [place] (www.mentalhealth.org): 318.209.1182 or 792-225-8016  [facility] Crisis Team:  [phone]    The treatment team has appreciated the opportunity to work with you.     If you have any questions or concerns our unit number is [phone]

## 2019-08-21 NOTE — H&P
"Admitted:     08/20/2019      IDENTIFYING INFORMATION:  Bertin Medina is a 24-year-old -American male who is presenting with suicidal ideation and auditory hallucinations.      CHIEF COMPLAINT:  \"I slipped down into my depression.\"      HISTORY OF PRESENT ILLNESS:  Bertin Medina is a 24-year-old, single, -American male presenting with auditory hallucinations and suicidal ideation.  The patient is an unreliable historian.  The patient reports that he has been having increasing depression and suicidal ideation for the past 2 weeks.  The patient reports that he has been taking his medications.  His aunt Raina has all of the medications and gives him medications when they are due.  The patient reports he has been having auditory hallucinations since he has been 11 years old.  Aunnegrito Paris confirms this.  The patient reports that he has been having thoughts of wanting to shoot himself with a gun.  The patient does not have access to a gun.  The patient states he does not know how to obtain a gun.  The patient appears to have some cognitive deficits.  The patient was recently treated at [facility] and discharged on 08/13.  At that time, his lithium was discontinued.  The patient and Aunt Raina report that his outpatient provider wanted the patient to continue taking the lithium only in a tapered manner.  Lithium level on admission was 0.55.  There is concern that creatinine is elevated at 1.35.  The patient's aunt reports that she knows \"there is something wrong when he is pacing.\"  The aunt reports that the patient has been pacing and expressing to her that he wants to shoot himself with a gun.  Goal for this hospitalization is medication adjustment.      PSYCHIATRIC REVIEW OF SYSTEMS:  The patient reports he is sad.  The patient reports he has chronic thoughts of worthlessness and not good enough.  The patient reports \"nobody likes me.\"  The patient reports isolating to his home, does not get out.  The " "patient states, \"I don't eat enough.\"  The patient reports passive suicidal thinking along with intent to harm self.  The patient denies any homicidal ideation.  The patient denies any type of risky behavior.  The patient reports he has social anxiety.  The patient states he has paranoid thinking.  The patient stated \"it's like if I left this room I would be able to hear your voice talking about me.\"  The patient reports chronic auditory hallucinations.  He denies visual hallucinations.  He reports feelings of paranoia.  He denies panic attacks.  The patient denies any symptoms of PTSD, eating disorder or OCD.      PSYCHIATRIC HISTORY:  The patient was recently hospitalized at [facility]  through , discharged without restarting lithium due to impaired kidney function.  One hospitalization at [facility] 10/2018.  The patient was hospitalized twice in [place] when he was 17 years old.      PAST MEDICAL HISTORY:  Elevated creatinine 1.35, hemoglobin is low at 11.7.  Lithium level 0.55.  U-tox was positive.     SUBSTANCE HISTORY: UTOX positive for cocaine and cannabis. Patient reports drinking whisky to socialize.      FAMILY HISTORY:  Mother is .  The patient reports he has a relationship with his father.  He has 7-8 siblings.  The patient is unaware of any mental illness in his family.  The patient lives with his aunt Raina.      SOCIAL HISTORY:  The patient reports he lives with his Spencer since he has been 3 years old.  He has worked at [employer] and he has worked at [employer].  Currently he is unemployed.  Aunt reports that patient has difficulty working due to his cognitive impairment.      MEDICAL REVIEW OF SYSTEMS:  Reviewed a 10-point systems review completed by Jayme Ng MD dated 2019.  No changes noted.      PHYSICAL EXAMINATION:   VITAL SIGNS:  Blood pressure 118/72, temperature 99.2 Fahrenheit, heart rate 64, respiration 14, SpO2 is at 100%.  Height 5 feet 10 inches, weight " 145 pounds.  Reviewed documentation for physical examination completed by Jayme Ng MD dated 08/20/2019.  No changes are noted.      MENTAL STATUS EXAMINATION:  The patient appears his stated age.  He is dressed in scrubs.  He has adequate hygiene.  The patient was in the hallway.  He was cooperative and accompanied me to the interview room.  He was calm and cooperative throughout the interview.  He was very polite.  He offered his hand to shake his hand upon meeting him.  Eye contact was adequate.  The patient did not display any psychomotor abnormalities.  Speech is spontaneous.  He used conversational rate, rhythm and tone.  The patient is a poor historian.  He was very guarded with his answers.  The patient did not know a lot.  The patient was not familiar with his medications and was unsure of answers to questions.  The patient describes his mood as depressed.  Affect was blunted and congruent.  Thought process linear and logical.  Associations were intact.  Thought content does display evidence of psychosis.  He is experiencing paranoid ideation and auditory hallucinations.  He is endorsing passive suicidal thinking along with active intent of shooting himself.  He denies homicidal thinking.  Insight and judgment appeared to be impaired.  Cognition appears intact to interviewing including orientation person, place, time and situation, use of language and fund of knowledge.  Recent and remote memory are grossly intact.  Muscle strength, tone and gait appear within normal limits upon observation.      ASSESSMENT:   1.  Schizoaffective disorder, bipolar type.   2.  Attention deficit hyperactivity disorder.   3.  OCD by history.   4.  History of oppositional defiant disorder.   5.  Alcohol use disorder, mild.   6.  Cannabis use disorder, mild.   7.  Cigarette nicotine dependence, currently in withdrawal.   8.  Acute kidney insufficiency.      PLAN:   1.  The patient has been admitted to behavioral unit 4A  on a voluntary basis.   2.  Discussed medications with both patient who is not familiar with his medication and with his aunt.  We will continue Risperdal 6 mg at bedtime to address auditory hallucinations.  The patient will not be restarted on lithium due to elevated creatinine.  We will start Lamictal 25 mg to address mood instability and depression.  Discussed risks, benefits and side effects of medications with patient.   3.  Psychosocial treatments to be addressed with CTC.  Estimated length of stay 3-5 days.         DEBRA A. NAEGELE, APRN, CNS             D: 2019   T: 2019   MT: KOFI      Name:     JUAN RAMON GOLDEN   MRN:      [id number]        Account:      [id number]   :      1995        Admitted:     2019                   Document: [id number]       cc: [facility]

## 2019-08-21 NOTE — PLAN OF CARE
INITIAL OT NOTE  Problem: OT General Care Plan  Goal: OT Goal 1  Pt will demonstrate consistent engagement in OT group activities that support recovery as evidenced by participating in >1 scheduled OT group/day for 5 days.      Pt actively participated in about x15 minutes (no charge) of occupational therapy clinic. Pt was able to ask for assistance as needed, and independently initiated a creative expression task. Pt demonstrated fair focus and planning, and limited attention to detail. Appeared comfortable interacting with peers when conversation was initiated with him. Calm and cooperative throughout his brief duration in group. Will continue to assess. Pt will be given self-assessment form, and OT staff will explain the purpose of including them in their treatment plan and offer options for meeting their needs. Initial assessment to be completed upon additional group participation.

## 2019-08-21 NOTE — PROGRESS NOTES
08/20/19 5993   Patient Belongings   Patient Belongings remains with patient;locker;sent to security per site process   Patient Belongings Remaining with Patient clothing   Patient Belongings Put in Hospital Secure Location (Security or Locker, etc.) money (see comment);wallet;shoes;clothing;cell phone/electronics;plastic bag;other (see comments)   Belongings Search Yes   Clothing Search Yes   Second Staff Bernardo RODRIGUEZ      Items to Security: 1 [facility] [employer] ID, 1 Rapid Master card ([id number]), and cash of Sixty cents( $0.60).  Items with patient: 1 Underwear.  Items in the locker: 1 [employer] Safety Temporary  's License, 1 Black wallet,  a pair of shoes with laces, 1 ashley trouser, a pair of socks, 1 T-shirt, 1 jacket with string, 1 Nike cap,  Pack of Cigaret with 2 sticks inside, 1 Cell phone, 1 Cigaret Lighter, and 1 plastic bag.  Items received 8/22/19: 1 undershirt, 3 pairs of underwear  A               Admission:  I am responsible for any personal items that are not sent to the safe or pharmacy.  [facility] is not responsible for loss, theft or damage of any property in my possession.    Signature:  _________________________________ Date: _______  Time: _____                                              Staff Signature:  ____________________________ Date: ________  Time: _____      2nd Staff person, if patient is unable/unwilling to sign:    Signature: ________________________________ Date: ________  Time: _____     Discharge:  [facility] has returned all of my personal belongings:    Signature: _________________________________ Date: ________  Time: _____                                          Staff Signature:  ____________________________ Date: ________  Time: _____

## 2019-08-21 NOTE — PLAN OF CARE
48 HOUR ASSESSMENT     Problem: Suicidal Behavior  Goal: Suicidal Behavior is Absent or Managed  Outcome: No Change     D: Pt woken up for breakfast and morning vitals. He presents with a full range affect. Patient  states his suicidal thoughts hare not better from yesterday. Patient is organized and logical in conversation. Patient presents well-groomed. Patient continues to be medication compliant.    Patient says drawing, writing and talking to people are effective coping skills for him.    Patient endorses SI thoughts only. Denies SIB/HI.   He denies anxiety and endorses depression 7/10.   He endorses AH which in the form of insults     A: Provided active listening, emotional encouragement and goal setting, safety planning safety checks q 15min, and medication administration.    R: Patient was behaviorally appropriate during this shift. Did not require any restraints or seclusion.

## 2019-08-21 NOTE — PROGRESS NOTES
Pt arrived on the unit at  from [facility] and was searched by two male staff. Pt cooperative with the search and was given snack and an orientation to the unit. Pt tired on arrival, but cooperative in answering interview questions. Pt admitted due SI with a plan to shoot himself. Per report from [facility], pt does not have access to guns, but has been perseverating on finding one. Pt's mother  when he was 3 years old, which he reports is a stressor. Pt's aunt has been his primary caregiver since that time. Pt hospitalized on Station 10 last week. Pt endorses current SI, but denies plan and contracts for safety on the unit. Pt endorses AH that are constant and states they say bad things about him. Pt denies VH and SIB. Pt reports marijuana use, and alcohol use socially, but denies any other drug use. However, Utox positive for THC and cocaine. Pt is voluntary;  consent signed and in pt chart.

## 2019-08-21 NOTE — PLAN OF CARE
BEHAVIORAL TEAM DISCUSSION    Participants: 4A Provider: Debra Naegele, APRN, CNS; 4A RN's: Catrachita Ramirez RN ; 4A CTC's: Jillian Levine (CTC).  Progress: No Change.  Continued Stay Criteria/Rationale: Suicidal Ideation  Medical/Physical: Acute Kidney Insufficiency   Precautions:    Behavioral Orders   Procedures    Code 1 - Restrict to Unit    Routine Programming     As clinically indicated    Status 15     Every 15 minutes.    Suicide precautions     Patients on Suicide Precautions should have a Combination Diet ordered that includes a Diet selection(s) AND a Behavioral Tray selection for Safe Tray - with utensils, or Safe Tray - NO utensils       Plan: The following services will be provided to the patient; psychiatric assessment, medication management, therapeutic milieu, individual and group support, art therapy, and skills/OT groups.   Rationale for change in precautions or plan: N/A

## 2019-08-21 NOTE — PROGRESS NOTES
"INITIAL PSYCHOSOCIAL ASSESSMENT    I have reviewed the chart and interviewed the patient.     Presenting Problem  Per ED provider note, \"Bertin Medina is a 24 year old male who presents to the emergency department today with suicidal ideation.  He presents with his aunt who helps provide history.  Symptoms have been worsening in the last 2 weeks and have been persisting despite outpatient treatment and medication adherence.  Patient does endorse some auditory hallucinations.  Patient has plans to harm himself with a gun.  There apparently is no access to a gun, but patient states that he will find some way to harm himself.  Denies any medication ingestions.  He presents with a note from his psychiatrist recommending decreasing his lithium dosing gradually over the next several days.  This is scanned into the chart.  Patient's aunt notes that he has been pacing, but is not violent.\" Jayme Ng MD  2019        Orders Placed This Encounter      Voluntary    Is patient under a civil commitment/legal guardian? Yes, Pt's aunt is his Guardian    History of Mental Health and Chemical Dependency:  Mental Health Hx:  Pt was diagnosed with bipolar at 17 and schizophrenia at 19, while living in [place].  Pt has a hx of Schizophrenia, Depression, ADHD, OCD, and ODD.  Pt has been hospitalized as an adolescent, Pt was hospitalized at [facility] on unit 10 on  due to depression and suicidal thoughts.  Pt was in special ed. in school.     Chemical Dependency Hx:  Pt does not have a hx of CD trx or diagnosis.  Pt confirms he occasionally uses THC and Alcohol, but doesn't feel he is dependent.  Utox was positive for Cannabinoids and cocaine upon admission.      Family Description (Constellation, Family Psychiatric History):  Pt reports family hx of schizophrenia      Significant Life Events (Illness, Abuse, Trauma, Death):  Pt's mother  when he was 2.5 and he was adopted by his aunt  Pt moved to [place] " in 2015     Living Situation:  Pt currently lives with his Aunt  Describes it as stable     Educational Background:  High school graduate  1 semester of college     Occupational History:  Currently unemployed  Most recent worked in a restaurant     Financial Status:  INCOME SOURCE: [employer]  INSURANCE: Medicare     Legal Issues:  Pt is voluntary  Denies legal issues     Ethnic/Cultural Issues:  Preferred Pronouns: Male     Spiritual Orientation:  None reported      Service History:  None     Current Treatment Providers are:  Therapist: Waiting for therapist at [facility]  Psychiatrist: [place] at [facility]  PCP: New provider at [facility]  Community supports/programs:  None at this time.     Social Service Assessment/Plan:  Patient has been admitted to station 10N due to needing hospitalization for safety and stabilization. Patient will have psychiatric assessment and medication management by the psychiatrist. Medications will be reviewed and adjusted per MD as indicated. The treatment team will continue to assess and stabilize the patient's mental health symptoms with the use of medications and therapeutic programming. Hospital staff will provide a safe environment and promote a therapeutic milieu. Staff will continue to assess patient as needed, informing treatment team of changes in presentation and improved status. Patient will be encouraged to participate in unit groups and activities. Patient will receive individual and group support on the unit. CTC will do individual inpatient treatment planning and after care planning with the goal of successful community reintegration while reducing chances of recidivism. CTC will discuss options for increasing community supports with the patient. CTC will coordinate with outpatient providers and will place referrals to ensure appropriate follow up care is in place as needed.

## 2019-08-22 PROCEDURE — 25000132 ZZH RX MED GY IP 250 OP 250 PS 637: Mod: GY | Performed by: PSYCHIATRY & NEUROLOGY

## 2019-08-22 PROCEDURE — 25000132 ZZH RX MED GY IP 250 OP 250 PS 637: Mod: GY | Performed by: CLINICAL NURSE SPECIALIST

## 2019-08-22 PROCEDURE — 99232 SBSQ HOSP IP/OBS MODERATE 35: CPT | Performed by: CLINICAL NURSE SPECIALIST

## 2019-08-22 PROCEDURE — G0177 OPPS/PHP; TRAIN & EDUC SERV: HCPCS

## 2019-08-22 PROCEDURE — 12400001 ZZH R&B MH UMMC

## 2019-08-22 PROCEDURE — H2032 ACTIVITY THERAPY, PER 15 MIN: HCPCS

## 2019-08-22 RX ADMIN — RISPERIDONE 6 MG: 3 TABLET ORAL at 20:29

## 2019-08-22 RX ADMIN — LAMOTRIGINE 25 MG: 25 TABLET ORAL at 07:40

## 2019-08-22 RX ADMIN — ESCITALOPRAM OXALATE 20 MG: 20 TABLET, FILM COATED ORAL at 07:40

## 2019-08-22 RX ADMIN — BENZTROPINE MESYLATE 0.5 MG: 0.5 TABLET ORAL at 20:29

## 2019-08-22 ASSESSMENT — ACTIVITIES OF DAILY LIVING (ADL)
HYGIENE/GROOMING: INDEPENDENT
ORAL_HYGIENE: INDEPENDENT
DRESS: INDEPENDENT
LAUNDRY: WITH SUPERVISION

## 2019-08-22 ASSESSMENT — MIFFLIN-ST. JEOR: SCORE: 1676.66

## 2019-08-22 NOTE — PROGRESS NOTES
"   08/22/19 1400   Behavioral Health   Hallucinations auditory   Thinking intact   Orientation person: oriented;place: oriented;date: oriented   Memory baseline memory   Insight admits / accepts   Judgement intact   Eye Contact at examiner   Affect full range affect   Mood mood is calm;depressed;anxious   Physical Appearance/Attire attire appropriate to age and situation   Hygiene well groomed   Suicidality other (see comments)  (Pt denies SI)   1. Wish to be Dead No   2. Non-Specific Active Suicidal Thoughts  No   Self Injury other (see comment)  (Pt denies SIB)   Elopement   (none observed)   Activity other (see comment)  (visible, active, social, participating)   Speech clear;coherent   Medication Sensitivity no observed side effects   Psychomotor / Gait balanced;steady     Pt was observed in the milieu eating meals, socializing with peers, and group participation.  Pt stated he feels better than yesterday, and rated depression at 5-6 (down from 7 yesterday), and anxiety at 6.  Pt stated his food has been sufficient, his meds are fine, but has noticed fatigue since stopping his ADHD medication.  Pt stated he slept better last night than he has previously.  Pt endorsed AH in the form of voices, stating that the meds are working for this, describing the voices as \"watered down\" and not distressing.  Pt denies HI/SI/SIB.  "

## 2019-08-22 NOTE — PLAN OF CARE
"Problem: OT General Care Plan  Goal: OT Goal 1  Pt will demonstrate consistent engagement in OT group activities that support recovery as evidenced by participating in >1 scheduled OT group/day for 5 days.      Pt attended 1 out of 2 OT groups offered. Pt actively participated in occupational therapy clinic. Pt was able to ask for assistance as needed, and independently initiated a goal-directed task. Pt demonstrated good attention to task, though took frequent breaks to socialize with peers. Pleasant on approach, and affect appeared to brighten with social interaction. Will continue to assess.     OT Self-Assessment  Pt was given and completed a written self-assessment form. OT staff reviewed with pt and explained the value of having them involved in their treatment plan, and provided options to meet current needs/self-identified goals.     Pt identified \"the death of my mom\" as stressors/events that led to hospitalization.    Pt identified the following symptoms that they are currently dealing with:   Emotions: Sadness, anxiety/fear, mood swings, feeling depressed, and feeling overwhelmed   Thoughts: Negative thoughts, racing thoughts, memory problems, trouble concentrating, trouble making decisions, hearing voices, slowed thinking, blanking out, and self-harm or suicidal thoughts  Behaviors: Restless behaviors and procrastinating     Self-identified coping skills: \"Drawing, basketball, talking\"  Self-identified social supports: \"My family the most\"  Self-identified personal strengths: \"Optimism\"    Goals: Learn to make choices and take action, learn more about my diagnosis, and manage stress     "

## 2019-08-22 NOTE — PROGRESS NOTES
"Pt led movements and discussions that evoked a sense of safety and relaxation.  He was able to recall vivid, loving memories and share them with the group.  His perspective was positive.  For example, he shared that his mother  when he was [age] years old, but then he was raised by two aunties and his family was \"bigger.\"  He also shared that he feels safer in [place] than he had in [facility].       19 1015   Dance Movement Therapy   Type of Intervention structured groups   Response participates, initiates socially appropriate   Hours 1     "

## 2019-08-22 NOTE — PROGRESS NOTES
Pt isolative to room for most of evening. Pt endorses depression at 7/10, denies all other MH symptoms including anxiety, hallucination, SI/SIB.  Pt says he is experiencing concentration issues as well. When in milieu, pt is social and appropriate with peers. No behavioral concerns, pt had no additional concerns to report at this time.     08/21/19 1900   Behavioral Health   Hallucinations denies / not responding to hallucinations   Thinking poor concentration   Orientation person: oriented;place: oriented;date: oriented;time: oriented   Memory baseline memory   Insight admits / accepts   Judgement impaired   Eye Contact at examiner   Affect full range affect   Mood mood is calm   Physical Appearance/Attire attire appropriate to age and situation   Hygiene well groomed   Suicidality other (see comments)  (denies)   1. Wish to be Dead No   2. Non-Specific Active Suicidal Thoughts  No   Self Injury   (denies)   Elopement   (none)   Activity isolative   Speech clear;coherent   Medication Sensitivity no stated side effects;no observed side effects   Psychomotor / Gait balanced;steady   Psycho Education   Type of Intervention 1:1 intervention   Response participates, initiates socially appropriate   Hours 0.5   Treatment Detail Check in   Group Therapy Session   Group Attendance attended group session

## 2019-08-22 NOTE — PROGRESS NOTES
"[facility], [place]   Psychiatric Progress Note        Interim History:   The patient's care was discussed with the treatment team during the daily team meeting and/or staff's chart notes were reviewed.  Staff report patient has been visible in the milieu.     Psychiatric symptoms and interventions:   Continued Risperdal 6 mg to address auditory hallucinations.   Started Lamictal 25 mg to address depression and mood instability. Patient not able to tolerate Lithium. Elevated creatinine.   Continue Lexapro 20 mg.     Patient rates depression 5/10 which he says is an improvement. Patient reports voices \"are there but not as loud.\" Patient was able to attend to the conversation. He is involved in groups. He has not been aggressive.     Medical: Obtaining CMP on Friday.    Behavioral/psyhcology/social:   Encouraged patient to attend therapeutic hospital programming         Medications:       benztropine  0.5 mg Oral At Bedtime     escitalopram  20 mg Oral Daily     lamoTRIgine  25 mg Oral Daily     risperiDONE  6 mg Oral At Bedtime          Allergies:   No Known Allergies       Labs:   No results found for this or any previous visit (from the past 24 hour(s)).       Psychiatric Examination:     /78   Pulse 80   Temp 97.2  F (36.2  C) (Tympanic)   Resp 16   Ht 1.778 m (5' 10\")   Wt 67.5 kg (148 lb 14.4 oz)   SpO2 99%   BMI 21.36 kg/m    Weight is 148 lbs 14.4 oz  Body mass index is 21.36 kg/m .  Orthostatic Vitals       Most Recent      Sitting Orthostatic /78 08/22 0700    Sitting Orthostatic Pulse (bpm) 80 08/22 0700    Standing Orthostatic /71 08/22 0700    Standing Orthostatic Pulse (bpm) 90 08/22 0700            Appearance: awake, alert, adequately groomed and dressed in hospital scrubs  Attitude:  cooperative  Eye Contact:  good  Mood:  better  Affect:  intensity is blunted  Speech:  clear, coherent and normal prosody  Psychomotor Behavior:  no evidence of " tardive dyskinesia, dystonia, or tics  Throught Process:  logical, linear and goal oriented  Associations:  no loose associations  Thought Content:  no evidence of suicidal ideation or homicidal ideation and auditory hallucinations present  Insight:  limited  Judgement:  limited  Oriented to:  time, person, and place  Attention Span and Concentration:  intact  Recent and Remote Memory:  intact    Clinical Global Impressions  First:  Considering your total clinical experience with this particular patient population, how severe are the patient's symptoms at this time?: 6 (08/21/19 0910)  Compared to the patient's condition at the START of treatment, this patient's condition is:: 6 (08/21/19 0910)  Most recent:  Considering your total clinical experience with this particular patient population, how severe are the patient's symptoms at this time?: 6 (08/21/19 0910)  Compared to the patient's condition at the START of treatment, this patient's condition is:: 6 (08/21/19 0910)         Precautions:     Behavioral Orders   Procedures     Code 1 - Restrict to Unit     Routine Programming     As clinically indicated     Status 15     Every 15 minutes.     Suicide precautions     Patients on Suicide Precautions should have a Combination Diet ordered that includes a Diet selection(s) AND a Behavioral Tray selection for Safe Tray - with utensils, or Safe Tray - NO utensils            DIagnoses:   1.  Schizoaffective disorder, bipolar type.   2.  Attention deficit hyperactivity disorder.   3.  OCD by history.   4.  History of oppositional defiant disorder.   5.  Alcohol use disorder, mild.   6.  Cannabis use disorder, mild.   7.  Cigarette nicotine dependence, currently in withdrawal.   8.  Acute kidney insufficiency.               Plan:     Legal:  Patient signed in voluntary. Patient is his own guardian. Aunt Johan is trying to apply for guardianship.     Medication management: Risperdal 6 mg, Lamictal 25 mg, Lithium  discontinued.     Disposition plan: Stabilize with medications, return to home with ACT team

## 2019-08-23 LAB
ALBUMIN SERPL-MCNC: 3.6 G/DL (ref 3.4–5)
ALBUMIN SERPL-MCNC: 4.2 G/DL (ref 3.4–5)
ALBUMIN UR-MCNC: 30 MG/DL
ALP SERPL-CCNC: 70 U/L (ref 40–150)
ALP SERPL-CCNC: 74 U/L (ref 40–150)
ALT SERPL W P-5'-P-CCNC: 20 U/L (ref 0–70)
ALT SERPL W P-5'-P-CCNC: 24 U/L (ref 0–70)
ANION GAP SERPL CALCULATED.3IONS-SCNC: 3 MMOL/L (ref 3–14)
ANION GAP SERPL CALCULATED.3IONS-SCNC: 7 MMOL/L (ref 3–14)
APPEARANCE UR: CLEAR
AST SERPL W P-5'-P-CCNC: 21 U/L (ref 0–45)
AST SERPL W P-5'-P-CCNC: 31 U/L (ref 0–45)
BASOPHILS # BLD AUTO: 0 10E9/L (ref 0–0.2)
BASOPHILS NFR BLD AUTO: 0.2 %
BILIRUB SERPL-MCNC: 0.3 MG/DL (ref 0.2–1.3)
BILIRUB SERPL-MCNC: 0.4 MG/DL (ref 0.2–1.3)
BILIRUB UR QL STRIP: NEGATIVE
BUN SERPL-MCNC: 13 MG/DL (ref 7–30)
BUN SERPL-MCNC: 14 MG/DL (ref 7–30)
CALCIUM SERPL-MCNC: 8.6 MG/DL (ref 8.5–10.1)
CALCIUM SERPL-MCNC: 8.9 MG/DL (ref 8.5–10.1)
CHLORIDE SERPL-SCNC: 103 MMOL/L (ref 94–109)
CHLORIDE SERPL-SCNC: 107 MMOL/L (ref 94–109)
CO2 SERPL-SCNC: 28 MMOL/L (ref 20–32)
CO2 SERPL-SCNC: 30 MMOL/L (ref 20–32)
COLOR UR AUTO: YELLOW
CREAT SERPL-MCNC: 1.2 MG/DL (ref 0.66–1.25)
CREAT SERPL-MCNC: 1.32 MG/DL (ref 0.66–1.25)
DIFFERENTIAL METHOD BLD: ABNORMAL
EOSINOPHIL # BLD AUTO: 0 10E9/L (ref 0–0.7)
EOSINOPHIL NFR BLD AUTO: 0.1 %
ERYTHROCYTE [DISTWIDTH] IN BLOOD BY AUTOMATED COUNT: 14.5 % (ref 10–15)
GFR SERPL CREATININE-BSD FRML MDRD: 75 ML/MIN/{1.73_M2}
GFR SERPL CREATININE-BSD FRML MDRD: 85 ML/MIN/{1.73_M2}
GLUCOSE SERPL-MCNC: 134 MG/DL (ref 70–99)
GLUCOSE SERPL-MCNC: 86 MG/DL (ref 70–99)
GLUCOSE UR STRIP-MCNC: NEGATIVE MG/DL
HCT VFR BLD AUTO: 38.4 % (ref 40–53)
HGB BLD-MCNC: 12.6 G/DL (ref 13.3–17.7)
HGB UR QL STRIP: NEGATIVE
IMM GRANULOCYTES # BLD: 0.1 10E9/L (ref 0–0.4)
IMM GRANULOCYTES NFR BLD: 0.5 %
KETONES UR STRIP-MCNC: NEGATIVE MG/DL
LEUKOCYTE ESTERASE UR QL STRIP: NEGATIVE
LIPASE SERPL-CCNC: 36 U/L (ref 73–393)
LYMPHOCYTES # BLD AUTO: 1.1 10E9/L (ref 0.8–5.3)
LYMPHOCYTES NFR BLD AUTO: 8.6 %
MCH RBC QN AUTO: 23.9 PG (ref 26.5–33)
MCHC RBC AUTO-ENTMCNC: 32.8 G/DL (ref 31.5–36.5)
MCV RBC AUTO: 73 FL (ref 78–100)
MONOCYTES # BLD AUTO: 0.9 10E9/L (ref 0–1.3)
MONOCYTES NFR BLD AUTO: 7 %
MUCOUS THREADS #/AREA URNS LPF: PRESENT /LPF
NEUTROPHILS # BLD AUTO: 10.8 10E9/L (ref 1.6–8.3)
NEUTROPHILS NFR BLD AUTO: 83.6 %
NITRATE UR QL: NEGATIVE
NRBC # BLD AUTO: 0 10*3/UL
NRBC BLD AUTO-RTO: 0 /100
PH UR STRIP: 7 PH (ref 5–7)
PLATELET # BLD AUTO: 232 10E9/L (ref 150–450)
POTASSIUM SERPL-SCNC: 3.7 MMOL/L (ref 3.4–5.3)
POTASSIUM SERPL-SCNC: 4.5 MMOL/L (ref 3.4–5.3)
PROT SERPL-MCNC: 7.1 G/DL (ref 6.8–8.8)
PROT SERPL-MCNC: 7.7 G/DL (ref 6.8–8.8)
RBC # BLD AUTO: 5.28 10E12/L (ref 4.4–5.9)
RBC #/AREA URNS AUTO: 11 /HPF (ref 0–2)
SODIUM SERPL-SCNC: 138 MMOL/L (ref 133–144)
SODIUM SERPL-SCNC: 140 MMOL/L (ref 133–144)
SOURCE: ABNORMAL
SP GR UR STRIP: 1.02 (ref 1–1.03)
SQUAMOUS #/AREA URNS AUTO: <1 /HPF (ref 0–1)
UROBILINOGEN UR STRIP-MCNC: 2 MG/DL (ref 0–2)
WBC # BLD AUTO: 12.9 10E9/L (ref 4–11)
WBC #/AREA URNS AUTO: 1 /HPF (ref 0–5)

## 2019-08-23 PROCEDURE — 25000132 ZZH RX MED GY IP 250 OP 250 PS 637: Mod: GY | Performed by: PSYCHIATRY & NEUROLOGY

## 2019-08-23 PROCEDURE — 99207 ZZC CONSULT E&M CHANGED TO INITIAL LEVEL: CPT | Performed by: INTERNAL MEDICINE

## 2019-08-23 PROCEDURE — 99232 SBSQ HOSP IP/OBS MODERATE 35: CPT | Performed by: CLINICAL NURSE SPECIALIST

## 2019-08-23 PROCEDURE — 83690 ASSAY OF LIPASE: CPT | Performed by: PHYSICIAN ASSISTANT

## 2019-08-23 PROCEDURE — 81001 URINALYSIS AUTO W/SCOPE: CPT | Performed by: INTERNAL MEDICINE

## 2019-08-23 PROCEDURE — 99221 1ST HOSP IP/OBS SF/LOW 40: CPT | Performed by: INTERNAL MEDICINE

## 2019-08-23 PROCEDURE — 80053 COMPREHEN METABOLIC PANEL: CPT | Performed by: PHYSICIAN ASSISTANT

## 2019-08-23 PROCEDURE — 25000125 ZZHC RX 250: Performed by: INTERNAL MEDICINE

## 2019-08-23 PROCEDURE — 25000131 ZZH RX MED GY IP 250 OP 636 PS 637: Mod: GY | Performed by: CLINICAL NURSE SPECIALIST

## 2019-08-23 PROCEDURE — 25000132 ZZH RX MED GY IP 250 OP 250 PS 637: Mod: GY | Performed by: CLINICAL NURSE SPECIALIST

## 2019-08-23 PROCEDURE — 25000132 ZZH RX MED GY IP 250 OP 250 PS 637: Mod: GY | Performed by: INTERNAL MEDICINE

## 2019-08-23 PROCEDURE — 36415 COLL VENOUS BLD VENIPUNCTURE: CPT | Performed by: CLINICAL NURSE SPECIALIST

## 2019-08-23 PROCEDURE — 12400001 ZZH R&B MH UMMC

## 2019-08-23 PROCEDURE — 36415 COLL VENOUS BLD VENIPUNCTURE: CPT | Performed by: PHYSICIAN ASSISTANT

## 2019-08-23 PROCEDURE — 85025 COMPLETE CBC W/AUTO DIFF WBC: CPT | Performed by: PHYSICIAN ASSISTANT

## 2019-08-23 PROCEDURE — G0177 OPPS/PHP; TRAIN & EDUC SERV: HCPCS

## 2019-08-23 PROCEDURE — 80053 COMPREHEN METABOLIC PANEL: CPT | Performed by: CLINICAL NURSE SPECIALIST

## 2019-08-23 RX ORDER — HYOSCYAMINE SULFATE 0.125 MG
125 TABLET ORAL EVERY 4 HOURS PRN
Status: COMPLETED | OUTPATIENT
Start: 2019-08-23 | End: 2019-08-25

## 2019-08-23 RX ORDER — PROCHLORPERAZINE 25 MG
25 SUPPOSITORY, RECTAL RECTAL EVERY 12 HOURS PRN
Status: DISCONTINUED | OUTPATIENT
Start: 2019-08-23 | End: 2019-08-27 | Stop reason: HOSPADM

## 2019-08-23 RX ORDER — ONDANSETRON 4 MG/1
4 TABLET, ORALLY DISINTEGRATING ORAL EVERY 6 HOURS PRN
Status: DISCONTINUED | OUTPATIENT
Start: 2019-08-23 | End: 2019-08-27 | Stop reason: HOSPADM

## 2019-08-23 RX ADMIN — PROCHLORPERAZINE 25 MG: 25 SUPPOSITORY RECTAL at 16:38

## 2019-08-23 RX ADMIN — RANITIDINE 150 MG: 150 TABLET ORAL at 20:08

## 2019-08-23 RX ADMIN — RISPERIDONE 6 MG: 3 TABLET ORAL at 21:28

## 2019-08-23 RX ADMIN — HYOSCYAMINE SULFATE 125 MCG: 0.12 TABLET ORAL at 18:55

## 2019-08-23 RX ADMIN — ONDANSETRON 4 MG: 4 TABLET, ORALLY DISINTEGRATING ORAL at 14:41

## 2019-08-23 RX ADMIN — HYDROXYZINE HYDROCHLORIDE 25 MG: 25 TABLET ORAL at 15:37

## 2019-08-23 RX ADMIN — ONDANSETRON 4 MG: 4 TABLET, ORALLY DISINTEGRATING ORAL at 20:08

## 2019-08-23 RX ADMIN — LAMOTRIGINE 25 MG: 25 TABLET ORAL at 09:04

## 2019-08-23 RX ADMIN — BENZTROPINE MESYLATE 0.5 MG: 0.5 TABLET ORAL at 21:28

## 2019-08-23 RX ADMIN — ESCITALOPRAM OXALATE 20 MG: 20 TABLET, FILM COATED ORAL at 09:04

## 2019-08-23 RX ADMIN — ALUMINUM HYDROXIDE, MAGNESIUM HYDROXIDE, AND DIMETHICONE 30 ML: 400; 400; 40 SUSPENSION ORAL at 13:04

## 2019-08-23 RX ADMIN — LIDOCAINE HYDROCHLORIDE 45 ML: 20 SOLUTION ORAL; TOPICAL at 18:50

## 2019-08-23 ASSESSMENT — ACTIVITIES OF DAILY LIVING (ADL)
ORAL_HYGIENE: INDEPENDENT
HYGIENE/GROOMING: INDEPENDENT
DRESS: SCRUBS (BEHAVIORAL HEALTH);INDEPENDENT
LAUNDRY: UNABLE TO COMPLETE

## 2019-08-23 NOTE — PROGRESS NOTES
08/23/19 1300   Behavioral Health   Hallucinations auditory;other (see comment)  (claims AH are becoming less severe/intense)   Thinking intact   Orientation person: oriented;place: oriented;date: oriented;time: oriented   Memory baseline memory   Insight admits / accepts   Judgement intact   Eye Contact at examiner   Affect full range affect   Mood mood is calm   Physical Appearance/Attire attire appropriate to age and situation   Suicidality other (see comments)  (denies)   1. Wish to be Dead No   2. Non-Specific Active Suicidal Thoughts  No   3. Active Sucidal Ideation with any Methods (Not Plan) Without Intent to Act  No   4. Active Suicidal Ideation with Some Intent to Act, Without Specific Plan  No   5. Active Suicidal Ideation with Specific Plan and Intent  No   Psycho Education   Type of Intervention 1:1 intervention   Response participates, initiates socially appropriate   Hours 0.5   Treatment Detail check in with pt     Pt had a good morning shift with no behavioral issues. Pt was nauseous for the latter half of the shift but addressed the issue with his nurse. Pt reports he feels safe on the unit and denies any SI/SIB/HI.

## 2019-08-23 NOTE — PROGRESS NOTES
"Pt reported he has been complaining of abdominal cramping, nausea and emesis since he ate lunch.  Pt was given Zofran and Maalox with no relief.  Pt reports he threw up both medications.   Upon assessment, pt's abdomen is soft but tender. No sign of distention noted.  Pt reports having one loose bowel movement.  Prn Hydroxyzine given with no relief due to pt throwing up the medication.  Pt's provider was notified.  Pt has new orders for Compazine supp 25 mg if no relief, put in an IM consult.  Vitals 132/76 - 98.4 - 86 - 16.  Will contine to monitor.    Pt was given Compazine 25 mg for nausea, emesis and abdominal cramping.  Pt reports he had temporary relief from  compazine but the \"pain has started up again\" per pt.     IM consult done.at 5:15pm.  Call from IM instructing this nurse to put in stat orders for CMP and CBC with diff labs at 5:25 pm.  Internal medicine came up to the unit to see pt at 5:50pm.  Pt has new orders for GI cocktail three times daily, Levsin 0.125 mg and Zantac 150 mg daily.  "

## 2019-08-23 NOTE — PROVIDER NOTIFICATION
"Pt reports nausea and GI upset following his noon meal. He denies any fever, chills, headache, constipation concerns or significant anxiety. He states taking a little small green colored pill at home when he feels nauseated, but unsure what the pill is. He states he ate a variety of foods, chicken, fruit loops, juices and even chocolate brownie and may just be that.   Prn maalox given 1300 for reports of GI upset, and patient reports plan to rest and to inform writer if upset worsens.   Two minutes later, patient reports having very large emesis unwitnessed, \"I feel better though\" he says and smiles.  Writer will monitor for resolution/improvement.       08/23/19 0841   Vital Signs   Temp 96  F (35.6  C)   Temp src Tympanic   Pulse 78   Pulse/Heart Rate Source Monitor   /72   Standing Orthostatic BP   Standing Orthostatic /74   Standing Orthostatic Pulse 71 bpm   Oxygen Therapy   SpO2 97 %   O2 Device None (Room air)         "

## 2019-08-23 NOTE — PROGRESS NOTES
"[facility], [place]   Psychiatric Progress Note        Interim History:   The patient's care was discussed with the treatment team during the daily team meeting and/or staff's chart notes were reviewed.  Staff report patient has been visible in the milieu    Psychiatric symptoms and interventions:   Continued Risperdal 6 mg to address auditory hallucinations.   Started Lamictal 25 mg to address depression and mood instability. Patient not able to tolerate Lithium. Elevated creatinine.   Continue Lexapro 20 mg.      Patient rates depression 5/10 which he says is an improvement. Patient reports voices \"are there but not as loud.\" Patient was able to attend to the conversation. He is involved in groups. He has not been aggressive.     8/23 Patient is interested in his medications. He is learning what they are used for and how they help him. This provider is not comfortable starting clozapine for patient. He does not have much support in the community and I think it would be difficult for him to get weekly labs draws and stay consistent with taking his medications.     Patient reports mood is improving rating his mood 4/10 with 10 being the worst. Patient smiled brightly when asked what he liked about the unit. He said the staff are nice and he likes the groups. He stated, \"My family is messed up\"  \"I live with Aunt Johan because she understands me. \"  He reports the \"voices are watered down.\"      Medical: 8/23 CMP WNL except creatinine 1.32 (elevated),  8/21 lipids WNL. .      Behavioral/psyhcology/social:   Encouraged patient to attend therapeutic hospital programming         Medications:       benztropine  0.5 mg Oral At Bedtime     escitalopram  20 mg Oral Daily     lamoTRIgine  25 mg Oral Daily     risperiDONE  6 mg Oral At Bedtime          Allergies:   No Known Allergies       Labs:     Recent Results (from the past 24 hour(s))   Comprehensive metabolic panel    Collection Time: " "08/23/19  8:19 AM   Result Value Ref Range    Sodium 140 133 - 144 mmol/L    Potassium 4.5 3.4 - 5.3 mmol/L    Chloride 107 94 - 109 mmol/L    Carbon Dioxide 30 20 - 32 mmol/L    Anion Gap 3 3 - 14 mmol/L    Glucose 86 70 - 99 mg/dL    Urea Nitrogen 13 7 - 30 mg/dL    Creatinine 1.32 (H) 0.66 - 1.25 mg/dL    GFR Estimate 75 >60 mL/min/[1.73_m2]    GFR Estimate If Black 87 >60 mL/min/[1.73_m2]    Calcium 8.6 8.5 - 10.1 mg/dL    Bilirubin Total 0.3 0.2 - 1.3 mg/dL    Albumin 3.6 3.4 - 5.0 g/dL    Protein Total 7.1 6.8 - 8.8 g/dL    Alkaline Phosphatase 70 40 - 150 U/L    ALT 20 0 - 70 U/L    AST 21 0 - 45 U/L          Psychiatric Examination:     /72   Pulse 78   Temp 96  F (35.6  C) (Tympanic)   Resp 16   Ht 1.778 m (5' 10\")   Wt 67.5 kg (148 lb 14.4 oz)   SpO2 97%   BMI 21.36 kg/m    Weight is 148 lbs 14.4 oz  Body mass index is 21.36 kg/m .  Orthostatic Vitals       Most Recent      Sitting Orthostatic /80 08/22 1654    Sitting Orthostatic Pulse (bpm) 69 08/22 1654    Standing Orthostatic /74 08/23 0841    Standing Orthostatic Pulse (bpm) 71 08/23 0841        Appearance: awake, alert, adequately groomed and dressed in hospital scrubs  Attitude:  cooperative  Eye Contact:  good  Mood:  better  Affect:  intensity is blunted  Speech:  clear, coherent and normal prosody  Psychomotor Behavior:  no evidence of tardive dyskinesia, dystonia, or tics  Throught Process:  logical, linear and goal oriented  Associations:  no loose associations  Thought Content:  no evidence of suicidal ideation or homicidal ideation and auditory hallucinations present  Insight:  limited  Judgement:  limited  Oriented to:  time, person, and place  Attention Span and Concentration:  intact  Recent and Remote Memory:  intact    Clinical Global Impressions  First:  Considering your total clinical experience with this particular patient population, how severe are the patient's symptoms at this time?: 6 (08/21/19 " 0910)  Compared to the patient's condition at the START of treatment, this patient's condition is:: 6 (08/21/19 0910)  Most recent:  Considering your total clinical experience with this particular patient population, how severe are the patient's symptoms at this time?: 6 (08/21/19 0910)  Compared to the patient's condition at the START of treatment, this patient's condition is:: 6 (08/21/19 0910)         Precautions:     Behavioral Orders   Procedures     Code 1 - Restrict to Unit     Routine Programming     As clinically indicated     Status 15     Every 15 minutes.     Suicide precautions     Patients on Suicide Precautions should have a Combination Diet ordered that includes a Diet selection(s) AND a Behavioral Tray selection for Safe Tray - with utensils, or Safe Tray - NO utensils            DIagnoses:   1.  Schizoaffective disorder, bipolar type.   2.  Attention deficit hyperactivity disorder.   3.  OCD by history.   4.  History of oppositional defiant disorder.   5.  Alcohol use disorder, mild.   6.  Cannabis use disorder, mild.   7.  Cigarette nicotine dependence, currently in withdrawal.   8.  Acute kidney insufficiency.        Plan:   Legal:  Patient signed in voluntary. Patient is his own guardian. Aunt Johan is trying to apply for guardianship.      Medication management: Risperdal 6 mg, Lamictal 25 mg, Lithium discontinued.      Disposition plan: Stabilize with medications, discussed with [facility] Program would be helpful to patient.

## 2019-08-23 NOTE — PROVIDER NOTIFICATION
"1400-Pt reports another emesis and nausea. He reports having nausea at home as well and that this not a new thing for him, \"I have a sensitive cough reflex and can vomit every morning when brushing my teeth\" he states normally he takes a small green pill at home and is asking for that.  Primary Provider Salma paged at 1415 and informed of patient repeat emesis x 2 and continued nausea.  VSS(below)  Provider states she doesn't want to order the prochlorperazine maleate that patient might be referring to and is listed on the paper or information received from previous outpatient provider.  zofran-odt 4 mg every 6 hours prn nausea ordered for now TORB and placed by writer.     08/23/19 1420   Vital Signs   Temp 98.5  F (36.9  C)   Temp src Tympanic   Pulse 77   Pulse/Heart Rate Source Monitor   /85   Oxygen Therapy   SpO2 100 %   O2 Device None (Room air)       "

## 2019-08-23 NOTE — PLAN OF CARE
"Problem: OT General Care Plan  Goal: OT Goal 1  Pt will demonstrate consistent engagement in OT group activities that support recovery as evidenced by participating in >1 scheduled OT group/day for 5 days.      Pt attended 2 out of 3 OT groups offered. Pt actively participated in occupational therapy clinic. Pt was able to ask for assistance as needed, and independently initiated a creative expression task. Pt demonstrated good focus, planning, and problem solving. Appropriately initiated conversation with peers and staff throughout group. He politely invited a peer to join group when this peer came into the group room to get water, and explained that the group materials helped with \"calming and relaxing.\" This peer decided to join group, and pt was given positive feedback for his polite encouragement. When offered a compliment on his drawing task, he stated \"thanks, but I used to be way better at it!\" Pleasant, cooperative, and engaged throughout groups. Affect appeared to brighten in interactions.     He attended but sat off to the side for the first half of the afternoon group, and requested to continue drawing independently (no charge). He reported getting sick after lunch, but stated he was feeling \"much better; I got it all out.\" After a few minutes, he was observed taking deep breaths and resting his head on the table, and was encouraged to rest in his room until he felt better (RN notified).     "

## 2019-08-23 NOTE — PLAN OF CARE
"48 Hour Nursing Assessment    Patient evaluation continues. Assessed mood, anxiety, thoughts and behavior. Is progressing toward goals. Encourage participation in groups and developing healthy coping skills. Will continue to assess.     Pt had a positive shift. He has been more active and social in the milieu. Pt denies SI and SIB. Pt still endorses AH, but states they are \"watered down.\" Pt has been taking his medications and denies side effects, but does report fatigue due to not taking his ADHD medication. Pt's aunt and cousin visited this evening, which appeared to go well. Pt reports sleep and appetite have been good. Pt denies any other concerns at this time.  "

## 2019-08-23 NOTE — CONSULTS
HOSPITALIST INITIAL CONSULT NOTE    Referring Provider:  Vic Dominguez MD      Reason for Consult     Management     History of Present Illness     Bertin Medina is 24 year old year old male with hx of Schizoaffective disorder, bipolar type, Attention deficit hyperactivity disorder, OCD, Oppositional defiant disorder, Alcohol use disorder,  Cannabis use disorder,Cigarette nicotine dependence, currently in withdrawal, and renal insufficiency was admitted to  for auditory hallucination.   Medicine consulted for nausea, vomiting, abdominal pain. Patient noticed these symptoms after lunch. He ate chicken tenders, and rice with salt. He feels food is the cause of his problem. Pain is intermittent cram py in nature around the umbilicus. He also reports multiple episode of small amount of non bloody, no bilious vomiting. He had loose stool this afternoon. Stool was non bloody. He was given Compazine suppository with some relief. He denies any fever, chills. He denies any lightheadedness or dizziness.             Past Medical History     Past Medical History:   Diagnosis Date     Bipolar 1 disorder (H)      Depression      Schizo affective schizophrenia (H)           Past Surgical History     No past surgical history on file.       Medications     All his current medications are reviewed in current medication section of [facility].  Home medications are reviewed.  Current Facility-Administered Medications   Medication     acetaminophen (TYLENOL) tablet 650 mg     alum & mag hydroxide-simethicone (MYLANTA ES/MAALOX  ES) suspension 30 mL     benztropine (COGENTIN) tablet 0.5 mg     benztropine (COGENTIN) tablet 0.5 mg     bisacodyl (DULCOLAX) Suppository 10 mg     escitalopram (LEXAPRO) tablet 20 mg     hydrOXYzine (ATARAX) tablet 25 mg     lamoTRIgine (LaMICtal) tablet 25 mg     magnesium hydroxide (MILK OF MAGNESIA) suspension 30 mL     OLANZapine (zyPREXA) tablet 5-10 mg    Or     OLANZapine (zyPREXA) injection 5-10 mg      ondansetron (ZOFRAN-ODT) ODT tab 4 mg     prochlorperazine (COMPAZINE) Suppository 25 mg     risperiDONE (risperDAL) tablet 6 mg     traZODone (DESYREL) tablet 50 mg        Allergies      No Known Allergies     Family History     Non pertinent      Social History     Social History     Socioeconomic History     Marital status: Single     Spouse name: Not on file     Number of children: Not on file     Years of education: Not on file     Highest education level: Not on file   Occupational History     Not on file   Social Needs     Financial resource strain: Not on file     Food insecurity:     Worry: Not on file     Inability: Not on file     Transportation needs:     Medical: Not on file     Non-medical: Not on file   Tobacco Use     Smoking status: Current Every Day Smoker     Packs/day: 0.25     Smokeless tobacco: Never Used   Substance and Sexual Activity     Alcohol use: Not Currently     Drug use: Yes     Types: Marijuana     Sexual activity: Not on file   Lifestyle     Physical activity:     Days per week: Not on file     Minutes per session: Not on file     Stress: Not on file   Relationships     Social connections:     Talks on phone: Not on file     Gets together: Not on file     Attends Yazidi service: Not on file     Active member of club or organization: Not on file     Attends meetings of clubs or organizations: Not on file     Relationship status: Not on file     Intimate partner violence:     Fear of current or ex partner: Not on file     Emotionally abused: Not on file     Physically abused: Not on file     Forced sexual activity: Not on file   Other Topics Concern     Not on file   Social History Narrative     Not on file       Review of Systems   A 10 point review of systems was taken and largely negative except for that which was stated above.      Physical Exam   General:   Vital signs:    Blood pressure 132/70, pulse 86, temperature 98.4  F (36.9  C), temperature source Tympanic, resp. rate  "16, height 1.778 m (5' 10\"), weight 67.5 kg (148 lb 14.4 oz), SpO2 100 %.  Estimated body mass index is 21.36 kg/m  as calculated from the following:    Height as of this encounter: 1.778 m (5' 10\").    Weight as of this encounter: 67.5 kg (148 lb 14.4 oz).    HEENT: NCAT,  no icterus/ pallor  Cardiovascular: S1 and S2 normal.    Respiratory: B/L CTA. Non laborious breathing.   GI: BS+ . Soft, NT. No rebound tenderness. No Mcburney's point tenderness. No Herrera's sign.   Neurology:  Alert, awake, and communicating.  Grossly intact.  Extremities: pre tibial edema      Laboratory and Imaging Studies     Laboratory and Imaging studies reviewed in the results review section of Epic. Pertinent studies are as below:    CMP  Recent Labs   Lab 08/23/19  0819 08/20/19  1707    139   POTASSIUM 4.5 4.1   CHLORIDE 107 108   CO2 30 28   ANIONGAP 3 3   GLC 86 92   BUN 13 17   CR 1.32* 1.35*   GFRESTIMATED 75 73   GFRESTBLACK 87 85   JOSE 8.6 8.8   PROTTOTAL 7.1 6.7*   ALBUMIN 3.6 3.5   BILITOTAL 0.3 0.1*   ALKPHOS 70 61   AST 21 31   ALT 20 21     CBC  Recent Labs   Lab 08/23/19  1802 08/20/19  1707   WBC 12.9* 7.2   RBC 5.28 4.88   HGB 12.6* 11.7*   HCT 38.4* 37.1*   MCV 73* 76*   MCH 23.9* 24.0*   MCHC 32.8 31.5   RDW 14.5 15.9*    242     INRNo lab results found in last 7 days.  Arterial Blood GasNo lab results found in last 7 days.       Impression/Recommendations     24 year old year old male with hx of Schizoaffective disorder, bipolar type, Attention deficit hyperactivity disorder, OCD, Oppositional defiant disorder, Alcohol use disorder,  Cannabis use disorder,Cigarette nicotine dependence, currently in withdrawal, and renal insufficiency was admitted to  for auditory hallucination.   Medicine consulted for nausea, vomiting, abdominal pain.     # Nausea, vomiting, Abdominal pain:   Symptoms predominantly point to upper abdominal location. No rebound tenderness, Mcburney's point tenderness, or Herrera's sign. " No alarming features like hematemesis or melena. Started with food intake. ? Food poisoning/Food intolerance. Other possibilities could be peptic ulcer disease, pancreatitis, functional disorders, substance use associated (recent Marijuana,and Alcohol use)  Afebrile. Having bowel movements.   - GI cocktail  - Ranitidine BID  - Hyoscyamine PRN  - Continue Ondansetron ODT, and Prochlorperazine suppository PRN  - CBC, CMP, Lipase   - No indication for abdominal imaging for now, but should be considered if no improvement in symptoms    # Psychiatry problem: Management per primary team      Thank you for your consult    Notify medicine for any clinical changes.       Addendum: Labs back. Elevate white count, most likely due to stress response. I have very low suspicion for infection at this time. Also Hemoglobin is up, suggesting some hemoconcentration. No electrolyte abnormality or worsening of renal function to suggest he would require IV fluids. Lipase is normal  Recheck Labs in AM, or PRN if worsening condition      Addendum: patient reported still having nausea, vomiting. Not witnessed by RN, but patient has small emesis. Advised RN to document emesis  In the meanwhile, I will obtain CT Abdomen with out contrast due to renal insufficiency.   Cross cover will follow.    Yifan Landry  Internal Medicine  Hospitalist  Pager no: [phone]               Yifan Landry  Internal Medicine/Hospitalist  [facility]  [phone]

## 2019-08-24 ENCOUNTER — APPOINTMENT (OUTPATIENT)
Dept: CT IMAGING | Facility: CLINIC | Age: 24
DRG: 885 | End: 2019-08-24
Attending: INTERNAL MEDICINE
Payer: MEDICARE

## 2019-08-24 LAB
ANION GAP SERPL CALCULATED.3IONS-SCNC: 6 MMOL/L (ref 3–14)
BASOPHILS # BLD AUTO: 0 10E9/L (ref 0–0.2)
BASOPHILS NFR BLD AUTO: 0.1 %
BUN SERPL-MCNC: 16 MG/DL (ref 7–30)
CALCIUM SERPL-MCNC: 9.5 MG/DL (ref 8.5–10.1)
CHLORIDE SERPL-SCNC: 102 MMOL/L (ref 94–109)
CO2 SERPL-SCNC: 32 MMOL/L (ref 20–32)
CREAT SERPL-MCNC: 1.28 MG/DL (ref 0.66–1.25)
DIFFERENTIAL METHOD BLD: ABNORMAL
EOSINOPHIL # BLD AUTO: 0 10E9/L (ref 0–0.7)
EOSINOPHIL NFR BLD AUTO: 0 %
ERYTHROCYTE [DISTWIDTH] IN BLOOD BY AUTOMATED COUNT: 14.5 % (ref 10–15)
GFR SERPL CREATININE-BSD FRML MDRD: 78 ML/MIN/{1.73_M2}
GLUCOSE SERPL-MCNC: 108 MG/DL (ref 70–99)
HCT VFR BLD AUTO: 39.7 % (ref 40–53)
HGB BLD-MCNC: 12.9 G/DL (ref 13.3–17.7)
IMM GRANULOCYTES # BLD: 0 10E9/L (ref 0–0.4)
IMM GRANULOCYTES NFR BLD: 0.2 %
LACTATE SERPL-SCNC: 1 MMOL/L (ref 0.4–2)
LYMPHOCYTES # BLD AUTO: 1.5 10E9/L (ref 0.8–5.3)
LYMPHOCYTES NFR BLD AUTO: 11.9 %
MCH RBC QN AUTO: 23.5 PG (ref 26.5–33)
MCHC RBC AUTO-ENTMCNC: 32.5 G/DL (ref 31.5–36.5)
MCV RBC AUTO: 72 FL (ref 78–100)
MONOCYTES # BLD AUTO: 1.2 10E9/L (ref 0–1.3)
MONOCYTES NFR BLD AUTO: 10 %
NEUTROPHILS # BLD AUTO: 9.4 10E9/L (ref 1.6–8.3)
NEUTROPHILS NFR BLD AUTO: 77.8 %
NRBC # BLD AUTO: 0 10*3/UL
NRBC BLD AUTO-RTO: 0 /100
PLATELET # BLD AUTO: 252 10E9/L (ref 150–450)
POTASSIUM SERPL-SCNC: 4.2 MMOL/L (ref 3.4–5.3)
RBC # BLD AUTO: 5.48 10E12/L (ref 4.4–5.9)
SODIUM SERPL-SCNC: 140 MMOL/L (ref 133–144)
WBC # BLD AUTO: 12.2 10E9/L (ref 4–11)

## 2019-08-24 PROCEDURE — 25000132 ZZH RX MED GY IP 250 OP 250 PS 637: Mod: GY | Performed by: CLINICAL NURSE SPECIALIST

## 2019-08-24 PROCEDURE — 12400001 ZZH R&B MH UMMC

## 2019-08-24 PROCEDURE — 99232 SBSQ HOSP IP/OBS MODERATE 35: CPT | Performed by: PHYSICIAN ASSISTANT

## 2019-08-24 PROCEDURE — 90853 GROUP PSYCHOTHERAPY: CPT

## 2019-08-24 PROCEDURE — 25000131 ZZH RX MED GY IP 250 OP 636 PS 637: Mod: GY | Performed by: CLINICAL NURSE SPECIALIST

## 2019-08-24 PROCEDURE — 25000132 ZZH RX MED GY IP 250 OP 250 PS 637: Mod: GY | Performed by: INTERNAL MEDICINE

## 2019-08-24 PROCEDURE — 85025 COMPLETE CBC W/AUTO DIFF WBC: CPT | Performed by: INTERNAL MEDICINE

## 2019-08-24 PROCEDURE — 80048 BASIC METABOLIC PNL TOTAL CA: CPT | Performed by: INTERNAL MEDICINE

## 2019-08-24 PROCEDURE — 25000125 ZZHC RX 250: Performed by: INTERNAL MEDICINE

## 2019-08-24 PROCEDURE — 83605 ASSAY OF LACTIC ACID: CPT | Performed by: PHYSICIAN ASSISTANT

## 2019-08-24 PROCEDURE — 36415 COLL VENOUS BLD VENIPUNCTURE: CPT | Performed by: INTERNAL MEDICINE

## 2019-08-24 PROCEDURE — 74150 CT ABDOMEN W/O CONTRAST: CPT

## 2019-08-24 PROCEDURE — 25000132 ZZH RX MED GY IP 250 OP 250 PS 637: Mod: GY | Performed by: PSYCHIATRY & NEUROLOGY

## 2019-08-24 PROCEDURE — 36415 COLL VENOUS BLD VENIPUNCTURE: CPT | Performed by: PHYSICIAN ASSISTANT

## 2019-08-24 RX ADMIN — ONDANSETRON 4 MG: 4 TABLET, ORALLY DISINTEGRATING ORAL at 03:14

## 2019-08-24 RX ADMIN — BENZTROPINE MESYLATE 0.5 MG: 0.5 TABLET ORAL at 21:18

## 2019-08-24 RX ADMIN — ONDANSETRON 4 MG: 4 TABLET, ORALLY DISINTEGRATING ORAL at 15:37

## 2019-08-24 RX ADMIN — LIDOCAINE HYDROCHLORIDE 30 ML: 20 SOLUTION ORAL; TOPICAL at 08:35

## 2019-08-24 RX ADMIN — PROCHLORPERAZINE 25 MG: 25 SUPPOSITORY RECTAL at 11:04

## 2019-08-24 RX ADMIN — RANITIDINE 150 MG: 150 TABLET ORAL at 21:18

## 2019-08-24 RX ADMIN — RISPERIDONE 6 MG: 3 TABLET ORAL at 21:18

## 2019-08-24 NOTE — PLAN OF CARE
"48 hour nursing assessment:  Pt evaluation continues. Assessed mood, anxiety, thoughts, and behavior. Is progressing towards goals. Is cooperative and easily redirectable. Encourage participation in groups and developing healthy coping skills. Pt denies auditory or visual  hallucinations. Refer to daily team meeting notes for individualized plan of care. Will continue to assess.  NAUSEA:  he continues to c/o nausea. There have been no emesis this morning. He was given sprite pop. At 0840 he asked for a suppository compazine but we tried lidocain & maalox cocktail first. No emesis. He is resting. He refused vitals but will get these at a later time. Will continue to assess.   1115, pt requested a \"shot\" for his nausea. He was given a compazine suppository and so wee will assess if this has any affect on his nausea. Vitals T 98.0  P 53 and /62 WNL. He is walking the hallway right now because he says he feels better when he walks. Will continue to assess.    "

## 2019-08-24 NOTE — PLAN OF CARE
Problem: Suicide Risk  Goal: Absence of Self-Harm  8/23/2019 2115 by Miranda Boss, RN  Outcome: Improving  8/23/2019 2059 by Miranda Boss, RN  Outcome: Improving   Pt has been sick all evening.  Pt did not attend or participate in any groups this shift.  Pt denies all mental health sx.   Pt has been struggling with nausea, emesis and abdominal cramping.  Will continue to monitor and offer support as needed.

## 2019-08-24 NOTE — PROGRESS NOTES
Pt took GI cocktail and Levsin with some relief.  Vitals were 130/85 - 98.8 - 81- 16.  Pt then took scheduled Zantac 150 mg and prn Zofran which he states made him feel much better.  Pt then requested for his HS pills stating he wants to take them so he can go to sleep.  Pt reports vomiting but continues to flush the toilet before staff has the time to assess it.  Pt was told not to flush the toilet but he continues to do so.

## 2019-08-24 NOTE — PROGRESS NOTES
Vitals 121/85 - 99.0 - 81-16.  Staff did observe a small amount of light yellow clear fluid in an emesis bowl.  Pt stated he threw up after drinking some ginger ale.  UA collected and sent down to the lab.  Pt has new orders for CT scan w/o contrast.

## 2019-08-25 PROCEDURE — 25000132 ZZH RX MED GY IP 250 OP 250 PS 637: Mod: GY | Performed by: PSYCHIATRY & NEUROLOGY

## 2019-08-25 PROCEDURE — 25000132 ZZH RX MED GY IP 250 OP 250 PS 637: Mod: GY | Performed by: INTERNAL MEDICINE

## 2019-08-25 PROCEDURE — 25000132 ZZH RX MED GY IP 250 OP 250 PS 637: Mod: GY | Performed by: CLINICAL NURSE SPECIALIST

## 2019-08-25 PROCEDURE — 25000131 ZZH RX MED GY IP 250 OP 636 PS 637: Mod: GY | Performed by: CLINICAL NURSE SPECIALIST

## 2019-08-25 PROCEDURE — 12400001 ZZH R&B MH UMMC

## 2019-08-25 RX ORDER — HYDROXYZINE HYDROCHLORIDE 25 MG/1
25-50 TABLET, FILM COATED ORAL EVERY 4 HOURS PRN
Status: DISCONTINUED | OUTPATIENT
Start: 2019-08-25 | End: 2019-08-27 | Stop reason: HOSPADM

## 2019-08-25 RX ADMIN — HYOSCYAMINE SULFATE 125 MCG: 0.12 TABLET ORAL at 15:46

## 2019-08-25 RX ADMIN — PROCHLORPERAZINE 25 MG: 25 SUPPOSITORY RECTAL at 11:42

## 2019-08-25 RX ADMIN — RISPERIDONE 6 MG: 3 TABLET ORAL at 21:22

## 2019-08-25 RX ADMIN — RANITIDINE 150 MG: 150 TABLET ORAL at 21:22

## 2019-08-25 RX ADMIN — HYDROXYZINE HYDROCHLORIDE 25 MG: 25 TABLET ORAL at 17:41

## 2019-08-25 RX ADMIN — BENZTROPINE MESYLATE 0.5 MG: 0.5 TABLET ORAL at 21:22

## 2019-08-25 RX ADMIN — RANITIDINE 150 MG: 150 TABLET ORAL at 09:56

## 2019-08-25 RX ADMIN — ONDANSETRON 4 MG: 4 TABLET, ORALLY DISINTEGRATING ORAL at 10:37

## 2019-08-25 RX ADMIN — ONDANSETRON 4 MG: 4 TABLET, ORALLY DISINTEGRATING ORAL at 16:32

## 2019-08-25 RX ADMIN — LAMOTRIGINE 25 MG: 25 TABLET ORAL at 09:56

## 2019-08-25 RX ADMIN — ESCITALOPRAM OXALATE 20 MG: 20 TABLET, FILM COATED ORAL at 09:56

## 2019-08-25 ASSESSMENT — MIFFLIN-ST. JEOR: SCORE: 1650.35

## 2019-08-25 ASSESSMENT — ACTIVITIES OF DAILY LIVING (ADL)
DRESS: SCRUBS (BEHAVIORAL HEALTH);INDEPENDENT
HYGIENE/GROOMING: INDEPENDENT
LAUNDRY: WITH SUPERVISION
ORAL_HYGIENE: INDEPENDENT

## 2019-08-25 NOTE — PLAN OF CARE
"NAUSEA:::Pt continues to c/o nausea but he has eaten some saltines and is drinking sprite. He has been up and pacing the hallway as pt states he does get restless and needs to be out of his room. Other wise pt feels he is ready to discharge on Monday. He denies SI, feels less depressed, brightening up. Will continue to assess.  At 1050, pt felt he needed to vomit and did emesis of flem x4. Will continue to follow this as well as keeping medicine informed.   Discussed this with Heather RODRIGUEZ. Pt has had these issues with vomiting very much so in his past. His aunt has also concurred with this. So will continue to treat this with zofran among other medicines. Liquids only at this time. Vitals WNL.  At 1145, pt received compazine suppository for continued feelings of nausea and many attempts at vomiting but these were only dry heaves. Pt states that he has had many of these nausea episodes in the past. \"I end up in the ED or the Dr's office\". His aunt whom he lives with agrees with this as she called this RN wondering how he is doing. Will continue to assess.  "

## 2019-08-25 NOTE — PLAN OF CARE
"  Problem: Suicide Risk  Goal: Absence of Self-Harm  Outcome: Improving     Problem: General Plan of Care (Inpatient Behavioral)  Goal: Individualization/Patient Specific Goal (IP Behavioral)  Description  The patient and/or their representative will achieve their patient-specific goals related to the plan of care.  Pt has been calm, socially appropriate with staff and peers.  Pt did not attend community meeting but did participate in psychotherapy group.  Pt's goal was to feel better and drink more fluids.  Goal met.  The patient-specific goals include:  Getting better prior to discharge.  Outcome: No change   Pt reports he is feeling better and wishes to discharge on Monday.  Pt denies SI/SIB.  Pt also denies depression and anxiety.  Pt had a prn Zofran x 1 this shift but declined dinner stating \" \"I cannot eat because I can't stand the smell of food\".   Pt did not have any emesis this shift.   Pt took his HS medications without any problems.  No medication side effect noted or reported.  Will continue to monitor and offer support as needed.  "

## 2019-08-25 NOTE — PROGRESS NOTES
"   08/24/19 2030   Group Therapy Session   Group Attendance attended group session   Total Time (minutes) 35   Group Type psychotherapeutic   Group Topic Covered emotions/expression   Patient Participation/Contribution cooperative with task;discussed personal experience with topic;listened actively;offered helpful suggestions to peers   Psychotherapy group goal:  Identify and process feelings in-the-moment utilizing a \"feelings heart\" activity.   Bertin was in a bright mood. He actively participated in group and openly shared responses during group discussion. He volunteered to share first and initiated conversations with this writer and other group members.     "

## 2019-08-25 NOTE — PROGRESS NOTES
"Pt continues to c/o nausea and abdominal cramping.  Pt sttes he had some relief from the Compazine suppository but the cramps and nausea \"are starting up again\".  Prn Zofran 4 mg given.  Pt did admit he had \"some relief but not much\".  Pt has had no emesis just nausea and dry heaving.  Vitals 135/78 - 97.2 - 73 - 16.   Will continue to monitor and offer support as needed.  "

## 2019-08-26 PROCEDURE — 25000132 ZZH RX MED GY IP 250 OP 250 PS 637: Mod: GY | Performed by: PSYCHIATRY & NEUROLOGY

## 2019-08-26 PROCEDURE — 25000131 ZZH RX MED GY IP 250 OP 636 PS 637: Mod: GY | Performed by: CLINICAL NURSE SPECIALIST

## 2019-08-26 PROCEDURE — 90853 GROUP PSYCHOTHERAPY: CPT

## 2019-08-26 PROCEDURE — 25000132 ZZH RX MED GY IP 250 OP 250 PS 637: Mod: GY | Performed by: INTERNAL MEDICINE

## 2019-08-26 PROCEDURE — 99232 SBSQ HOSP IP/OBS MODERATE 35: CPT | Performed by: CLINICAL NURSE SPECIALIST

## 2019-08-26 PROCEDURE — 12400001 ZZH R&B MH UMMC

## 2019-08-26 PROCEDURE — 25000132 ZZH RX MED GY IP 250 OP 250 PS 637: Mod: GY | Performed by: CLINICAL NURSE SPECIALIST

## 2019-08-26 RX ADMIN — RANITIDINE 150 MG: 150 TABLET ORAL at 21:28

## 2019-08-26 RX ADMIN — ESCITALOPRAM OXALATE 20 MG: 20 TABLET, FILM COATED ORAL at 09:06

## 2019-08-26 RX ADMIN — LAMOTRIGINE 25 MG: 25 TABLET ORAL at 09:05

## 2019-08-26 RX ADMIN — RISPERIDONE 6 MG: 3 TABLET ORAL at 21:27

## 2019-08-26 RX ADMIN — HYDROXYZINE HYDROCHLORIDE 50 MG: 25 TABLET ORAL at 15:36

## 2019-08-26 RX ADMIN — BENZTROPINE MESYLATE 0.5 MG: 0.5 TABLET ORAL at 21:28

## 2019-08-26 RX ADMIN — HYDROXYZINE HYDROCHLORIDE 50 MG: 25 TABLET ORAL at 11:23

## 2019-08-26 RX ADMIN — PROCHLORPERAZINE 25 MG: 25 SUPPOSITORY RECTAL at 10:01

## 2019-08-26 RX ADMIN — ONDANSETRON 4 MG: 4 TABLET, ORALLY DISINTEGRATING ORAL at 09:15

## 2019-08-26 RX ADMIN — RANITIDINE 150 MG: 150 TABLET ORAL at 09:06

## 2019-08-26 ASSESSMENT — ACTIVITIES OF DAILY LIVING (ADL)
HYGIENE/GROOMING: INDEPENDENT
DRESS: INDEPENDENT
DRESS: INDEPENDENT
ORAL_HYGIENE: INDEPENDENT
ORAL_HYGIENE: INDEPENDENT
HYGIENE/GROOMING: INDEPENDENT

## 2019-08-26 NOTE — PLAN OF CARE
"  Problem: Suicidal Behavior  Goal: Suicidal Behavior is Absent or Managed  Outcome: Improving   Pt denies SI/SIB at this time.   Pt states he is not depressed or anxious,all he wants is to \"be discharged\".  Pt has been safe here with no negative behaviors.  Pt has been calm and visible in the milieu.  Pt did attend and participate in all groups.   Pt did not require any restraints/seclusion this shift.  Pt reports he is feeling much better.  No emesis this shift.  Denies all mental health sx  VSS.  To monitor and off support as needed.       "

## 2019-08-26 NOTE — PROGRESS NOTES
"[facility], [place]   Psychiatric Progress Note        Interim History:   The patient's care was discussed with the treatment team during the daily team meeting and/or staff's chart notes were reviewed.  Staff report patient  has been visible in the milieu    Psychiatric symptoms and interventions:   Continued Risperdal 6 mg to address auditory hallucinations.   Started Lamictal 25 mg to address depression and mood instability. Patient not able to tolerate Lithium. Elevated creatinine.   Continue Lexapro 20 mg.     Patient reports improved mood and denies suicidal ideation. He states the voices \"are watered down\". Patient has been a problem with nausea. Patient is doing better today with nausea. No vomiting. Patient will discharge to home on Tuesday.     Medical: 8/23 CMP WNL except creatinine 1.32 (elevated),  8/21 lipids WNL. 8/24 creatinine 1.28. .      Behavioral/psyhcology/social:   Encouraged patient to attend therapeutic hospital programming            Medications:       benztropine  0.5 mg Oral At Bedtime     escitalopram  20 mg Oral Daily     lamoTRIgine  25 mg Oral Daily     ranitidine  150 mg Oral BID     risperiDONE  6 mg Oral At Bedtime          Allergies:   No Known Allergies       Labs:   No results found for this or any previous visit (from the past 24 hour(s)).       Psychiatric Examination:     /78 (BP Location: Right arm)   Pulse 73   Temp 97.8  F (36.6  C) (Tympanic)   Resp 18   Ht 1.778 m (5' 10\")   Wt 64.9 kg (143 lb 1.6 oz)   SpO2 97%   BMI 20.53 kg/m    Weight is 143 lbs 1.6 oz  Body mass index is 20.53 kg/m .  Orthostatic Vitals       Most Recent      Sitting Orthostatic /55 08/26 0700    Sitting Orthostatic Pulse (bpm) 85 08/26 0700    Standing Orthostatic /70 08/26 0700    Standing Orthostatic Pulse (bpm) 103 08/26 0700          Appearance: awake, alert, adequately groomed and dressed in hospital scrubs  Attitude:  cooperative  Eye " Contact:  good  Mood:  better  Affect:  intensity is blunted  Speech:  clear, coherent and normal prosody  Psychomotor Behavior:  no evidence of tardive dyskinesia, dystonia, or tics  Throught Process:  logical, linear and goal oriented  Associations:  no loose associations  Thought Content:  no evidence of suicidal ideation or homicidal ideation and auditory hallucinations present  Insight:  limited  Judgement:  limited  Oriented to:  time, person, and place  Attention Span and Concentration:  intact  Recent and Remote Memory:  intact    Clinical Global Impressions  First:  Considering your total clinical experience with this particular patient population, how severe are the patient's symptoms at this time?: 6 (08/21/19 0910)  Compared to the patient's condition at the START of treatment, this patient's condition is:: 6 (08/21/19 0910)  Most recent:  Considering your total clinical experience with this particular patient population, how severe are the patient's symptoms at this time?: 6 (08/21/19 0910)  Compared to the patient's condition at the START of treatment, this patient's condition is:: 6 (08/21/19 0910)         Precautions:     Behavioral Orders   Procedures     Code 1 - Restrict to Unit     Code 2     Routine Programming     As clinically indicated     Status 15     Every 15 minutes.     Suicide precautions     Patients on Suicide Precautions should have a Combination Diet ordered that includes a Diet selection(s) AND a Behavioral Tray selection for Safe Tray - with utensils, or Safe Tray - NO utensils            DIagnoses:   1.  Schizoaffective disorder, bipolar type.   2.  Attention deficit hyperactivity disorder.   3.  OCD by history.   4.  History of oppositional defiant disorder.   5.  Alcohol use disorder, mild.   6.  Cannabis use disorder, mild.   7.  Cigarette nicotine dependence, currently in withdrawal.   8.  Acute kidney insufficiency.          Plan:     Legal:  Patient signed in voluntary.  Patient is his own guardian. Aunt Johan is trying to apply for guardianship.      Medication management: Risperdal 6 mg, Lamictal 25 mg, Lithium discontinued.      Disposition plan: Stabilize with medications, discussed with [facility] First Episode Program would be helpful to patient.

## 2019-08-26 NOTE — DISCHARGE INSTRUCTIONS
Behavioral Discharge Planning and Instructions      Summary: You were admitted on 8/20/2019 to [facility] [address] [place] [address] due to suicidal ideation. You were treated by Debra Naegele, APRN, CNS and discharged on 08/26/2019 to Home    Principal Diagnosis:   Schizoaffective disorder, bipolar type.   Attention deficit hyperactivity disorder.   OCD by history.   History of oppositional defiant disorder.   Alcohol use disorder, mild.   Cannabis use disorder, mild.   Cigarette nicotine dependence, currently in withdrawal.       Health Care Follow-up Appointments:   Navigate Program Intake appointment  Date: 09/17/2019  Time: 1:00PM      Provider: Merari Prince  Address: [facility]  [address], [address] [place] [address]   Phone: [phone]        Attend all scheduled appointments with your outpatient providers. Call at least 24 hours in advance if you need to reschedule an appointment to ensure continued access to your outpatient providers.   Major Treatments, Procedures and Findings: You were provided with: a psychiatric assessment, assessed for medical stability, medication evaluation and/or management, group therapy, art therapy, milieu management, medical interventions and skills/OT groups.    Symptoms to Report: If you experience any of the following symptoms please report them right away to your provider or to family/friends; feeling more aggressive, increased confusion, losing more sleep, mood getting worse or thoughts of suicide.    Early warning signs can include: Early warning signs that could signal a potential relapse could include but not limited to the following; increased depression or anxiety sleep disturbances increased thoughts or behaviors of suicide or self-harm increased unusual thinking, such as paranoia or hearing voices.     Safety and Wellness:  Take all medicines as directed.  Make no changes unless your doctor suggests them. Follow treatment recommendations.  Refrain  "from alcohol and non-prescribed drugs. If there is a concern for safety, call 911.    Resources: Mental health crisis response for your county is offered 24 hours a day, 7 days a week. A trained counselor will assess your current situation, offer support and counseling and connect you with local resources. Please call  [facility] Mental Health Crisis Team:  [phone]    Crisis Intervention: 906.785.5477 or 625-474-4295 (TTY: 247.724.7450).  Call anytime for help.  [place] on [place]ness (www.[place].seferino.org): 597.969.6950 or 040-278-2267.  Alcoholics Anonymous (www.alcoholics-anonymous.org): Check your phone book for your local chapter.  Suicide Awareness Voices of Education (SAVE) (www.save.org): 094-350-RVGS (8740)  National Suicide Prevention Line (www.mentalhealthmn.org): 682-825-XUTM (7371)  Mental Health Consumer/Survivor Network of [place] (www.mhcsn.net): 569.960.6139 or 469-042-5608  Mental Health Association of [place] (www.mentalhealth.org): 390.849.7917 or 205-723-3873  Self- Management and Recovery Training., SMART-- Toll free: 649.689.9284  www.[employer].com  Text 4 Life: txt \"LIFE\" to 57914 for immediate support and crisis intervention  Crisis text line: Text \"[place]\" to 508652. Free, confidential, 24/7.    The treatment team has appreciated the opportunity to work with you. Bertin, please take care and make your recovery a daily recovery. If you have any questions or concerns our unit number is [phone].  You will be receiving a follow-up phone call within the next three days from a representative from behavioral health.  You have identified the best phone number to reach you as [phone] (home)       "

## 2019-08-26 NOTE — PROGRESS NOTES
08/25/19 7752   General Information   Art Directive other (see comments)   AT directive is to create two drawings. One drawing representing one thing pt likes about themselves and the other drawing representing one thing pt would like to change about themselves. Goal of directive: self awareness/transformation, emotional expression, assessing motivation for change. Pt creating a drawing of various colors and shared with group that the colors represent various emotions and he is glad that he can experience a range of emotions. For the second drawing pt described the lines as disorganized, chaos and shared that this image represents his anxiety, which he would like to work on.  Pts mood was calm.

## 2019-08-26 NOTE — PROGRESS NOTES
Writer spoke with pt's aunt and she states that he has a DrChandni Appointment for his kidney's scheduled already.

## 2019-08-26 NOTE — PROGRESS NOTES
Writer reached out to [facility] to attempt to connect with pt's CM. Writer left vm on supervisors vm, due to cm being unknown.

## 2019-08-26 NOTE — PROGRESS NOTES
Pt denies SI/SIB. States he is tired from the medication he took, but was unsure which one made him tired. Pt did not attend any groups or eat any meals today. Pt states he is still nauseated, but doing better. Pt has been sleeping or lying down much of the shift.      08/26/19 1400   Behavioral Health   Hallucinations auditory   Orientation person: oriented;place: oriented;date: oriented   Memory baseline memory   Judgement impaired   Eye Contact into space   Affect blunted, flat   Mood mood is calm   Physical Appearance/Attire attire appropriate to age and situation   Hygiene well groomed   Suicidality other (see comments)  (denies)   1. Wish to be Dead No   2. Non-Specific Active Suicidal Thoughts  No   Self Injury other (see comment)  (denies)   Elopement   (No concerning statements or behaviors)   Activity isolative;withdrawn;refusal   Speech coherent;clear   Medication Sensitivity sedation   Psychomotor / Gait steady;balanced   Psycho Education   Type of Intervention 1:1 intervention   Response participates with encouragement   Hours 0.5   Treatment Detail   (Check in)   Activities of Daily Living   Hygiene/Grooming independent   Oral Hygiene independent   Dress independent   Room Organization independent   Activity   Activity Assistance Provided independent

## 2019-08-27 VITALS
SYSTOLIC BLOOD PRESSURE: 135 MMHG | OXYGEN SATURATION: 97 % | TEMPERATURE: 98 F | HEART RATE: 100 BPM | RESPIRATION RATE: 18 BRPM | BODY MASS INDEX: 20.36 KG/M2 | DIASTOLIC BLOOD PRESSURE: 60 MMHG | WEIGHT: 142.2 LBS | HEIGHT: 70 IN

## 2019-08-27 PROCEDURE — H2032 ACTIVITY THERAPY, PER 15 MIN: HCPCS

## 2019-08-27 PROCEDURE — 99239 HOSP IP/OBS DSCHRG MGMT >30: CPT | Performed by: CLINICAL NURSE SPECIALIST

## 2019-08-27 PROCEDURE — 25000132 ZZH RX MED GY IP 250 OP 250 PS 637: Mod: GY | Performed by: CLINICAL NURSE SPECIALIST

## 2019-08-27 PROCEDURE — 25000132 ZZH RX MED GY IP 250 OP 250 PS 637: Mod: GY | Performed by: INTERNAL MEDICINE

## 2019-08-27 PROCEDURE — 25000132 ZZH RX MED GY IP 250 OP 250 PS 637: Mod: GY | Performed by: PSYCHIATRY & NEUROLOGY

## 2019-08-27 RX ORDER — HYDROXYZINE HYDROCHLORIDE 25 MG/1
25-50 TABLET, FILM COATED ORAL EVERY 4 HOURS PRN
Qty: 120 TABLET | Refills: 1 | Status: SHIPPED | OUTPATIENT
Start: 2019-08-27 | End: 2024-01-06

## 2019-08-27 RX ORDER — LAMOTRIGINE 25 MG/1
25 TABLET ORAL DAILY
Qty: 7 TABLET | Refills: 0 | Status: SHIPPED | OUTPATIENT
Start: 2019-08-28 | End: 2024-01-06

## 2019-08-27 RX ORDER — ONDANSETRON 4 MG/1
4 TABLET, ORALLY DISINTEGRATING ORAL EVERY 6 HOURS PRN
Qty: 120 TABLET | Refills: 0 | Status: SHIPPED | OUTPATIENT
Start: 2019-08-27 | End: 2024-01-06

## 2019-08-27 RX ORDER — LAMOTRIGINE 25 MG/1
50 TABLET ORAL DAILY
Qty: 60 TABLET | Refills: 0 | Status: SHIPPED | OUTPATIENT
Start: 2019-09-03 | End: 2024-01-06

## 2019-08-27 RX ORDER — LAMOTRIGINE 25 MG/1
50 TABLET ORAL DAILY
Status: DISCONTINUED | OUTPATIENT
Start: 2019-09-04 | End: 2019-08-27 | Stop reason: HOSPADM

## 2019-08-27 RX ADMIN — RANITIDINE 150 MG: 150 TABLET ORAL at 08:26

## 2019-08-27 RX ADMIN — ESCITALOPRAM OXALATE 20 MG: 20 TABLET, FILM COATED ORAL at 08:26

## 2019-08-27 RX ADMIN — LAMOTRIGINE 25 MG: 25 TABLET ORAL at 08:26

## 2019-08-27 ASSESSMENT — MIFFLIN-ST. JEOR: SCORE: 1646.25

## 2019-08-27 NOTE — PROGRESS NOTES
Pt was visible and active the majority of the evening. Pt spent his time walking the sidhu socializing with peers and listening to music. Pt states that he had a good day and this he feels much better. Pt ate a banana and had several apple juices this evening. Pt did not eat his dinner, stating that he is concerned that it could make him feel sick again. Pt denies depression, anxiety, SI and SIB. Pt is hopeful for discharge tomorrow.       08/26/19 2100   Behavioral Health   Hallucinations denies / not responding to hallucinations   Orientation person: oriented;place: oriented;date: oriented;time: oriented   Memory baseline memory   Insight insight appropriate to situation   Judgement intact   Eye Contact at examiner   Affect full range affect   Mood mood is calm   Physical Appearance/Attire attire appropriate to age and situation   Hygiene well groomed   1. Wish to be Dead No   2. Non-Specific Active Suicidal Thoughts  No   Self Injury other (see comment)  (denies)   Activity (WDL) WDL   Speech (WDL) WDL   Psychomotor Gait (WDL) WDL   Activities of Daily Living   Hygiene/Grooming independent   Oral Hygiene independent   Dress independent   Room Organization independent

## 2019-08-27 NOTE — PROGRESS NOTES
"Dicussed with patient his/her Personal Plan of Care.      \"Reasons you are in the hospital;\" The patient identifies the following reasons for current hospitalization:   Suicidal thoughts  Depression  Anxiety  Voices      \"Goals for Discharge\" The patient identifies the following goals for discharge:  Draw more  Communicate with my problems  Work on anger  "

## 2019-08-27 NOTE — PROGRESS NOTES
Behavioral Health  Note    Behavioral Health  Spirituality Group Note    [address]    Name: Bertin Medina YOB: 1995   MRN: [id number] Age: 24 year old      Patient attended -led group, which included discussion of spirituality, coping with illness and building resilience.    Patient attended group for NC hrs.    patient demonstrated an appreciation of topic's application for their personal circumstances. and Bertin left group early, but engaged actively when he was present.      Megan Santoyo  Chaplain Resident  Pager  [phone]

## 2019-08-27 NOTE — DISCHARGE SUMMARY
"Psychiatric Discharge Summary    Bertin Medina MRN# [id number]   Age: 24 year old YOB: 1995     Date of Admission:  8/20/2019  Date of Discharge:  8/27/2019  Admitting Physician:  Vic Dominguez MD  Discharge Physician:  Debra A. Naegele, APRN CNS (Contact: [phone])         Event Leading to Hospitalization:    Bertin Medina is a 24-year-old, single, -American male presenting with auditory hallucinations and suicidal ideation.  The patient is an unreliable historian.  The patient reports that he has been having increasing depression and suicidal ideation for the past 2 weeks.  The patient reports that he has been taking his medications.  His aunt Raina has all of the medications and gives him medications when they are due.  The patient reports he has been having auditory hallucinations since he has been 11 years old.  Aunnegrito Paris confirms this.  The patient reports that he has been having thoughts of wanting to shoot himself with a gun.  The patient does not have access to a gun.  The patient states he does not know how to obtain a gun.  The patient appears to have some cognitive deficits.  The patient was recently treated at [facility] and discharged on 08/13.  At that time, his lithium was discontinued.  The patient and Aunt Raina report that his outpatient provider wanted the patient to continue taking the lithium only in a tapered manner.  Lithium level on admission was 0.55.  There is concern that creatinine is elevated at 1.35.  The patient's aunt reports that she knows \"there is something wrong when he is pacing.\"  The aunt reports that the patient has been pacing and expressing to her that he wants to shoot himself with a gun.  Goal for this hospitalization is medication adjustment.        See Admission note by Naegele, Debra Ann, APRN CNS on 8/21/2019 for additional details.          DIagnoses:            Labs:     Results for orders placed or performed during the hospital " encounter of 08/20/19   CT Abdomen w/o Contrast    Narrative    EXAMINATION: CT ABDOMEN W/O CONTRAST, 8/24/2019 12:48 AM    TECHNIQUE:  Helical CT images from the lung bases through the  symphysis pubis were obtained without IV contrast. Contrast dose: None    COMPARISON: None available    HISTORY: Nausea, vomiting    FINDINGS:    Liver: Normal parenchymal attenuation without focal mass.  Biliary system: Gallbladder is within normal limits. No intrahepatic  or extrahepatic biliary ductal dilatation.  Pancreas: No focal mass or dilation of the main pancreatic duct.  Stomach: Within normal limits.  Spleen: Within normal limits.  Adrenal glands: Within normal limits.  Kidneys: No focal mass, hydronephrosis, or stone.  Bladder: Within normal limits.  Reproductive organs: Within normal limits.  Colon: Within normal limits.  Appendix: Within normal limits.  Small Bowel: Short segment of small bowel near lesser sac in left  upper quadrant with pneumatosis-like appearance.  Lymph nodes: No intra-abdominal or pelvic lymphadenopathy.  Vasculature: Within normal limits.    Lung bases: Clear. Cardiac size is within normal limits. Pericardial  fluid, likely physiologic.    Bones and soft tissues: No suspicious soft tissue mass or fluid  collection. No suspicious osseous lesion.      Impression    IMPRESSION:Short segment of small bowel near lesser sac in left upper  quadrant with pneumatosis-like appearance. No associated wall  thickening, therefore suspect this is a not true pneumatosis. Interval  clinical (including lactate) and/or imaging follow up suggested.   No acute findings in the abdomen or pelvis.     I have personally reviewed the examination and initial interpretation  and I agree with the findings.    TAI PINTO MD   Lipid panel   Result Value Ref Range    Cholesterol 116 <200 mg/dL    Triglycerides 59 <150 mg/dL    HDL Cholesterol 41 >39 mg/dL    LDL Cholesterol Calculated 63 <100 mg/dL    Non HDL Cholesterol  75 <130 mg/dL   TSH with free T4 reflex and/or T3 as indicated   Result Value Ref Range    TSH 1.62 0.40 - 4.00 mU/L   Comprehensive metabolic panel   Result Value Ref Range    Sodium 140 133 - 144 mmol/L    Potassium 4.5 3.4 - 5.3 mmol/L    Chloride 107 94 - 109 mmol/L    Carbon Dioxide 30 20 - 32 mmol/L    Anion Gap 3 3 - 14 mmol/L    Glucose 86 70 - 99 mg/dL    Urea Nitrogen 13 7 - 30 mg/dL    Creatinine 1.32 (H) 0.66 - 1.25 mg/dL    GFR Estimate 75 >60 mL/min/[1.73_m2]    GFR Estimate If Black 87 >60 mL/min/[1.73_m2]    Calcium 8.6 8.5 - 10.1 mg/dL    Bilirubin Total 0.3 0.2 - 1.3 mg/dL    Albumin 3.6 3.4 - 5.0 g/dL    Protein Total 7.1 6.8 - 8.8 g/dL    Alkaline Phosphatase 70 40 - 150 U/L    ALT 20 0 - 70 U/L    AST 21 0 - 45 U/L   CBC with platelets differential   Result Value Ref Range    WBC 12.9 (H) 4.0 - 11.0 10e9/L    RBC Count 5.28 4.4 - 5.9 10e12/L    Hemoglobin 12.6 (L) 13.3 - 17.7 g/dL    Hematocrit 38.4 (L) 40.0 - 53.0 %    MCV 73 (L) 78 - 100 fl    MCH 23.9 (L) 26.5 - 33.0 pg    MCHC 32.8 31.5 - 36.5 g/dL    RDW 14.5 10.0 - 15.0 %    Platelet Count 232 150 - 450 10e9/L    Diff Method Automated Method     % Neutrophils 83.6 %    % Lymphocytes 8.6 %    % Monocytes 7.0 %    % Eosinophils 0.1 %    % Basophils 0.2 %    % Immature Granulocytes 0.5 %    Nucleated RBCs 0 0 /100    Absolute Neutrophil 10.8 (H) 1.6 - 8.3 10e9/L    Absolute Lymphocytes 1.1 0.8 - 5.3 10e9/L    Absolute Monocytes 0.9 0.0 - 1.3 10e9/L    Absolute Eosinophils 0.0 0.0 - 0.7 10e9/L    Absolute Basophils 0.0 0.0 - 0.2 10e9/L    Abs Immature Granulocytes 0.1 0 - 0.4 10e9/L    Absolute Nucleated RBC 0.0    Comprehensive metabolic panel   Result Value Ref Range    Sodium 138 133 - 144 mmol/L    Potassium 3.7 3.4 - 5.3 mmol/L    Chloride 103 94 - 109 mmol/L    Carbon Dioxide 28 20 - 32 mmol/L    Anion Gap 7 3 - 14 mmol/L    Glucose 134 (H) 70 - 99 mg/dL    Urea Nitrogen 14 7 - 30 mg/dL    Creatinine 1.20 0.66 - 1.25 mg/dL    GFR  Estimate 85 >60 mL/min/[1.73_m2]    GFR Estimate If Black >90 >60 mL/min/[1.73_m2]    Calcium 8.9 8.5 - 10.1 mg/dL    Bilirubin Total 0.4 0.2 - 1.3 mg/dL    Albumin 4.2 3.4 - 5.0 g/dL    Protein Total 7.7 6.8 - 8.8 g/dL    Alkaline Phosphatase 74 40 - 150 U/L    ALT 24 0 - 70 U/L    AST 31 0 - 45 U/L   Lipase   Result Value Ref Range    Lipase 36 (L) 73 - 393 U/L   UA with Microscopic reflex to Culture   Result Value Ref Range    Color Urine Yellow     Appearance Urine Clear     Glucose Urine Negative NEG^Negative mg/dL    Bilirubin Urine Negative NEG^Negative    Ketones Urine Negative NEG^Negative mg/dL    Specific Gravity Urine 1.023 1.003 - 1.035    Blood Urine Negative NEG^Negative    pH Urine 7.0 5.0 - 7.0 pH    Protein Albumin Urine 30 (A) NEG^Negative mg/dL    Urobilinogen mg/dL 2.0 0.0 - 2.0 mg/dL    Nitrite Urine Negative NEG^Negative    Leukocyte Esterase Urine Negative NEG^Negative    Source Midstream Urine     WBC Urine 1 0 - 5 /HPF    RBC Urine 11 (H) 0 - 2 /HPF    Squamous Epithelial /HPF Urine <1 0 - 1 /HPF    Mucous Urine Present (A) NEG^Negative /LPF   CBC with platelets differential   Result Value Ref Range    WBC 12.2 (H) 4.0 - 11.0 10e9/L    RBC Count 5.48 4.4 - 5.9 10e12/L    Hemoglobin 12.9 (L) 13.3 - 17.7 g/dL    Hematocrit 39.7 (L) 40.0 - 53.0 %    MCV 72 (L) 78 - 100 fl    MCH 23.5 (L) 26.5 - 33.0 pg    MCHC 32.5 31.5 - 36.5 g/dL    RDW 14.5 10.0 - 15.0 %    Platelet Count 252 150 - 450 10e9/L    Diff Method Automated Method     % Neutrophils 77.8 %    % Lymphocytes 11.9 %    % Monocytes 10.0 %    % Eosinophils 0.0 %    % Basophils 0.1 %    % Immature Granulocytes 0.2 %    Nucleated RBCs 0 0 /100    Absolute Neutrophil 9.4 (H) 1.6 - 8.3 10e9/L    Absolute Lymphocytes 1.5 0.8 - 5.3 10e9/L    Absolute Monocytes 1.2 0.0 - 1.3 10e9/L    Absolute Eosinophils 0.0 0.0 - 0.7 10e9/L    Absolute Basophils 0.0 0.0 - 0.2 10e9/L    Abs Immature Granulocytes 0.0 0 - 0.4 10e9/L    Absolute Nucleated RBC  0.0    Basic metabolic panel   Result Value Ref Range    Sodium 140 133 - 144 mmol/L    Potassium 4.2 3.4 - 5.3 mmol/L    Chloride 102 94 - 109 mmol/L    Carbon Dioxide 32 20 - 32 mmol/L    Anion Gap 6 3 - 14 mmol/L    Glucose 108 (H) 70 - 99 mg/dL    Urea Nitrogen 16 7 - 30 mg/dL    Creatinine 1.28 (H) 0.66 - 1.25 mg/dL    GFR Estimate 78 >60 mL/min/[1.73_m2]    GFR Estimate If Black >90 >60 mL/min/[1.73_m2]    Calcium 9.5 8.5 - 10.1 mg/dL   Lactic acid   Result Value Ref Range    Lactic Acid 1.0 0.4 - 2.0 mmol/L   Internal Medicine Adult IP Consult for [facility] Young Adult on 4A: Patient to be seen: ASAP within 4 hrs; Call back #: [phone]; Abdominal pain, nausea and emesis; Consultant may enter orders: Yes; Requesting provider? Attending physician; Name: Yifan Fuentes MD     8/23/2019 10:06 PM  HOSPITALIST INITIAL CONSULT NOTE    Referring Provider:  Vic Dominguez MD      Reason for Consult     Management     History of Present Illness     Bertin Medina is 24 year old year old male with hx of   Schizoaffective disorder, bipolar type, Attention deficit   hyperactivity disorder, OCD, Oppositional defiant disorder,   Alcohol use disorder,  Cannabis use disorder,Cigarette nicotine   dependence, currently in withdrawal, and renal insufficiency was   admitted to  for auditory hallucination.   Medicine consulted for nausea, vomiting, abdominal pain. Patient   noticed these symptoms after lunch. He ate chicken tenders, and   rice with salt. He feels food is the cause of his problem. Pain   is intermittent cram py in nature around the umbilicus. He also   reports multiple episode of small amount of non bloody, no   bilious vomiting. He had loose stool this afternoon. Stool was   non bloody. He was given Compazine suppository with some relief.   He denies any fever, chills. He denies any lightheadedness or   dizziness.             Past Medical History     Past Medical History:   Diagnosis Date      Bipolar 1 disorder (H)      Depression      Schizo affective schizophrenia (H)           Past Surgical History     No past surgical history on file.       Medications     All his current medications are reviewed in current medication   section of Epic.  Home medications are reviewed.  Current Facility-Administered Medications   Medication     acetaminophen (TYLENOL) tablet 650 mg     alum & mag hydroxide-simethicone (MYLANTA ES/MAALOX  ES)   suspension 30 mL     benztropine (COGENTIN) tablet 0.5 mg     benztropine (COGENTIN) tablet 0.5 mg     bisacodyl (DULCOLAX) Suppository 10 mg     escitalopram (LEXAPRO) tablet 20 mg     hydrOXYzine (ATARAX) tablet 25 mg     lamoTRIgine (LaMICtal) tablet 25 mg     magnesium hydroxide (MILK OF MAGNESIA) suspension 30 mL     OLANZapine (zyPREXA) tablet 5-10 mg    Or     OLANZapine (zyPREXA) injection 5-10 mg     ondansetron (ZOFRAN-ODT) ODT tab 4 mg     prochlorperazine (COMPAZINE) Suppository 25 mg     risperiDONE (risperDAL) tablet 6 mg     traZODone (DESYREL) tablet 50 mg        Allergies      No Known Allergies     Family History     Non pertinent      Social History     Social History     Socioeconomic History     Marital status: Single     Spouse name: Not on file     Number of children: Not on file     Years of education: Not on file     Highest education level: Not on file   Occupational History     Not on file   Social Needs     Financial resource strain: Not on file     Food insecurity:     Worry: Not on file     Inability: Not on file     Transportation needs:     Medical: Not on file     Non-medical: Not on file   Tobacco Use     Smoking status: Current Every Day Smoker     Packs/day: 0.25     Smokeless tobacco: Never Used   Substance and Sexual Activity     Alcohol use: Not Currently     Drug use: Yes     Types: Marijuana     Sexual activity: Not on file   Lifestyle     Physical activity:     Days per week: Not on file     Minutes per session: Not on file      "Stress: Not on file   Relationships     Social connections:     Talks on phone: Not on file     Gets together: Not on file     Attends Evangelical service: Not on file     Active member of club or organization: Not on file     Attends meetings of clubs or organizations: Not on file     Relationship status: Not on file     Intimate partner violence:     Fear of current or ex partner: Not on file     Emotionally abused: Not on file     Physically abused: Not on file     Forced sexual activity: Not on file   Other Topics Concern     Not on file   Social History Narrative     Not on file       Review of Systems   A 10 point review of systems was taken and largely negative   except for that which was stated above.      Physical Exam   General:   Vital signs:    Blood pressure 132/70, pulse 86, temperature 98.4  F (36.9  C),   temperature source Tympanic, resp. rate 16, height 1.778 m (5'   10\"), weight 67.5 kg (148 lb 14.4 oz), SpO2 100 %.  Estimated body mass index is 21.36 kg/m  as calculated from the   following:    Height as of this encounter: 1.778 m (5' 10\").    Weight as of this encounter: 67.5 kg (148 lb 14.4 oz).    HEENT: NCAT,  no icterus/ pallor  Cardiovascular: S1 and S2 normal.    Respiratory: B/L CTA. Non laborious breathing.   GI: BS+ . Soft, NT. No rebound tenderness. No Mcburney's point   tenderness. No Herrera's sign.   Neurology:  Alert, awake, and communicating.  Grossly intact.  Extremities: pre tibial edema      Laboratory and Imaging Studies     Laboratory and Imaging studies reviewed in the results review   section of Epic. Pertinent studies are as below:    CMP  Recent Labs   Lab 08/23/19  0819 08/20/19  1707    139   POTASSIUM 4.5 4.1   CHLORIDE 107 108   CO2 30 28   ANIONGAP 3 3   GLC 86 92   BUN 13 17   CR 1.32* 1.35*   GFRESTIMATED 75 73   GFRESTBLACK 87 85   JOSE 8.6 8.8   PROTTOTAL 7.1 6.7*   ALBUMIN 3.6 3.5   BILITOTAL 0.3 0.1*   ALKPHOS 70 61   AST 21 31   ALT 20 21     CBC  Recent " Labs   Lab 08/23/19  1802 08/20/19  1707   WBC 12.9* 7.2   RBC 5.28 4.88   HGB 12.6* 11.7*   HCT 38.4* 37.1*   MCV 73* 76*   MCH 23.9* 24.0*   MCHC 32.8 31.5   RDW 14.5 15.9*    242     INRNo lab results found in last 7 days.  Arterial Blood GasNo lab results found in last 7 days.       Impression/Recommendations     24 year old year old male with hx of Schizoaffective disorder,   bipolar type, Attention deficit hyperactivity disorder, OCD,   Oppositional defiant disorder, Alcohol use disorder,  Cannabis   use disorder,Cigarette nicotine dependence, currently in   withdrawal, and renal insufficiency was admitted to  for   auditory hallucination.   Medicine consulted for nausea, vomiting, abdominal pain.     # Nausea, vomiting, Abdominal pain:   Symptoms predominantly point to upper abdominal location. No   rebound tenderness, Mcburney's point tenderness, or Herrera's   sign. No alarming features like hematemesis or melena. Started   with food intake. ? Food poisoning/Food intolerance. Other   possibilities could be peptic ulcer disease, pancreatitis,   functional disorders, substance use associated (recent   Marijuana,and Alcohol use)  Afebrile. Having bowel movements.   - GI cocktail  - Ranitidine BID  - Hyoscyamine PRN  - Continue Ondansetron ODT, and Prochlorperazine suppository PRN  - CBC, CMP, Lipase   - No indication for abdominal imaging for now, but should be   considered if no improvement in symptoms    # Psychiatry problem: Management per primary team      Thank you for your consult    Notify medicine for any clinical changes.       Addendum: Labs back. Elevate white count, most likely due to   stress response. I have very low suspicion for infection at this   time. Also Hemoglobin is up, suggesting some hemoconcentration.   No electrolyte abnormality or worsening of renal function to   suggest he would require IV fluids. Lipase is normal  Recheck Labs in AM, or PRN if worsening  condition      Addendum: patient reported still having nausea, vomiting. Not   witnessed by RN, but patient has small emesis. Advised RN to   document emesis  In the meanwhile, I will obtain CT Abdomen with out contrast due   to renal insufficiency.   Cross cover will follow.    [facility]  Internal Medicine  Hospitalist  Pager no: [phone]               [facility]  Internal Medicine/Hospitalist  [facility] [phone]                  Consults:   Consultation during this admission received from Internal medicine    HOSPITALIST INITIAL CONSULT NOTE     Referring Provider:   Vic Dominguez MD        Reason for Consult      Management      History of Present Illness      Bertin Medina is 24 year old year old male with hx of Schizoaffective disorder, bipolar type, Attention deficit hyperactivity disorder, OCD, Oppositional defiant disorder, Alcohol use disorder,  Cannabis use disorder,Cigarette nicotine dependence, currently in withdrawal, and renal insufficiency was admitted to  for auditory hallucination.   Medicine consulted for nausea, vomiting, abdominal pain. Patient noticed these symptoms after lunch. He ate chicken tenders, and rice with salt. He feels food is the cause of his problem. Pain is intermittent cram py in nature around the umbilicus. He also reports multiple episode of small amount of non bloody, no bilious vomiting. He had loose stool this afternoon. Stool was non bloody. He was given Compazine suppository with some relief. He denies any fever, chills. He denies any lightheadedness or dizziness.               Past Medical History      Past Medical History        Past Medical History:   Diagnosis Date     Bipolar 1 disorder (H)       Depression       Schizo affective schizophrenia (H)                 Past Surgical History      Past Surgical History   No past surgical history on file.            Medications      All his current medications are reviewed in  current medication section of Epic.  Home medications are reviewed.      Current Facility-Administered Medications   Medication     acetaminophen (TYLENOL) tablet 650 mg     alum & mag hydroxide-simethicone (MYLANTA ES/MAALOX  ES) suspension 30 mL     benztropine (COGENTIN) tablet 0.5 mg     benztropine (COGENTIN) tablet 0.5 mg     bisacodyl (DULCOLAX) Suppository 10 mg     escitalopram (LEXAPRO) tablet 20 mg     hydrOXYzine (ATARAX) tablet 25 mg     lamoTRIgine (LaMICtal) tablet 25 mg     magnesium hydroxide (MILK OF MAGNESIA) suspension 30 mL     OLANZapine (zyPREXA) tablet 5-10 mg     Or     OLANZapine (zyPREXA) injection 5-10 mg     ondansetron (ZOFRAN-ODT) ODT tab 4 mg     prochlorperazine (COMPAZINE) Suppository 25 mg     risperiDONE (risperDAL) tablet 6 mg     traZODone (DESYREL) tablet 50 mg         Allergies       No Known Allergies      Family History      Non pertinent        Social History      Social History   Social History      Socioeconomic History     Marital status: Single       Spouse name: Not on file     Number of children: Not on file     Years of education: Not on file     Highest education level: Not on file   Occupational History     Not on file   Social Needs     Financial resource strain: Not on file     Food insecurity:       Worry: Not on file       Inability: Not on file     Transportation needs:       Medical: Not on file       Non-medical: Not on file   Tobacco Use     Smoking status: Current Every Day Smoker       Packs/day: 0.25     Smokeless tobacco: Never Used   Substance and Sexual Activity     Alcohol use: Not Currently     Drug use: Yes       Types: Marijuana     Sexual activity: Not on file   Lifestyle     Physical activity:       Days per week: Not on file       Minutes per session: Not on file     Stress: Not on file   Relationships     Social connections:       Talks on phone: Not on file       Gets together: Not on file       Attends Judaism service: Not on file        "Active member of club or organization: Not on file       Attends meetings of clubs or organizations: Not on file       Relationship status: Not on file     Intimate partner violence:       Fear of current or ex partner: Not on file       Emotionally abused: Not on file       Physically abused: Not on file       Forced sexual activity: Not on file   Other Topics Concern     Not on file   Social History Narrative     Not on file            Review of Systems   A 10 point review of systems was taken and largely negative except for that which was stated above.        Physical Exam   General:   Vital signs:    Blood pressure 132/70, pulse 86, temperature 98.4  F (36.9  C), temperature source Tympanic, resp. rate 16, height 1.778 m (5' 10\"), weight 67.5 kg (148 lb 14.4 oz), SpO2 100 %.  Estimated body mass index is 21.36 kg/m  as calculated from the following:    Height as of this encounter: 1.778 m (5' 10\").    Weight as of this encounter: 67.5 kg (148 lb 14.4 oz).    HEENT: NCAT,  no icterus/ pallor  Cardiovascular: S1 and S2 normal.    Respiratory: B/L CTA. Non laborious breathing.   GI: BS+ . Soft, NT. No rebound tenderness. No Mcburney's point tenderness. No Herrera's sign.   Neurology:  Alert, awake, and communicating.  Grossly intact.  Extremities: pre tibial edema        Laboratory and Imaging Studies      Laboratory and Imaging studies reviewed in the results review section of Epic. Pertinent studies are as below:     CMP       Recent Labs   Lab 08/23/19  0819 08/20/19  1707    139   POTASSIUM 4.5 4.1   CHLORIDE 107 108   CO2 30 28   ANIONGAP 3 3   GLC 86 92   BUN 13 17   CR 1.32* 1.35*   GFRESTIMATED 75 73   GFRESTBLACK 87 85   JOSE 8.6 8.8   PROTTOTAL 7.1 6.7*   ALBUMIN 3.6 3.5   BILITOTAL 0.3 0.1*   ALKPHOS 70 61   AST 21 31   ALT 20 21      CBC       Recent Labs   Lab 08/23/19  1802 08/20/19  1707   WBC 12.9* 7.2   RBC 5.28 4.88   HGB 12.6* 11.7*   HCT 38.4* 37.1*   MCV 73* 76*   MCH 23.9* 24.0*   MCHC " 32.8 31.5   RDW 14.5 15.9*    242      INRNo lab results found in last 7 days.  Arterial Blood GasNo lab results found in last 7 days.         Impression/Recommendations      24 year old year old male with hx of Schizoaffective disorder, bipolar type, Attention deficit hyperactivity disorder, OCD, Oppositional defiant disorder, Alcohol use disorder,  Cannabis use disorder,Cigarette nicotine dependence, currently in withdrawal, and renal insufficiency was admitted to  for auditory hallucination.   Medicine consulted for nausea, vomiting, abdominal pain.      # Nausea, vomiting, Abdominal pain:   Symptoms predominantly point to upper abdominal location. No rebound tenderness, Mcburney's point tenderness, or Herrera's sign. No alarming features like hematemesis or melena. Started with food intake. ? Food poisoning/Food intolerance. Other possibilities could be peptic ulcer disease, pancreatitis, functional disorders, substance use associated (recent Marijuana,and Alcohol use)  Afebrile. Having bowel movements.   - GI cocktail  - Ranitidine BID  - Hyoscyamine PRN  - Continue Ondansetron ODT, and Prochlorperazine suppository PRN  - CBC, CMP, Lipase   - No indication for abdominal imaging for now, but should be considered if no improvement in symptoms     # Psychiatry problem: Management per primary team        Thank you for your consult     Notify medicine for any clinical changes.         Addendum: Labs back. Elevate white count, most likely due to stress response. I have very low suspicion for infection at this time. Also Hemoglobin is up, suggesting some hemoconcentration. No electrolyte abnormality or worsening of renal function to suggest he would require IV fluids. Lipase is normal  Recheck Labs in AM, or PRN if worsening condition        Addendum: patient reported still having nausea, vomiting. Not witnessed by RN, but patient has small emesis. Advised RN to document emesis  In the meanwhile, I will obtain  "CT Abdomen with out contrast due to renal insufficiency.   Cross cover will follow.     [facility]  Internal Medicine  [facility] Course:   Bertin Medina was admitted to Station 4A with attending [facility] Dominguez MD as a voluntary patient. The patient was placed under status 15 (15 minute checks) to ensure patient safety.     Patient was admitted for suicidal ideation after a fight with his cousin. Patient does not feel that he has support from his family. He reports that his Aunt Johan is the only one who understands him. Continued Risperdal 6 mg to address auditory hallucinations. When patient was in structured environment , he reported his auditory hallucinations were \"watered down\" and were not distressing to him. I did not feel comfortable starting clozapine on patient due to his lack of support in the community. Lithium was discontinued due to elevated creatinine on admission 1/28 . Started Lamictal for mood stabilization. Discussed risks , benefits and side effects of medications with patient.     Patient had bouts of nausea/vomiting during his stay. Ranitidine was started by internal medicine. He used Zofran in addition to treat his nausea. Recommendation is to follow up with primary care regarding his nausea.     Recommendation to follow up with nephrology for elevated creatinine.     Patient was referred to First Episode program for more support in the community.     Bertin Medina did participate in groups and was visible in the milieu. The patient's symptoms of suicidal ideation improved. Patient's mood improved and he denies suicidal ideation. Auditory hallucinations decreased and were not distressing to patient. Patient denied any homicidal ideation.     Patient no longer meets criteria for hospital level of care.     He is at moderate risk of release due to his psychiatric history.     Bertin Medina was released to home. At the time of discharge Bertin Medina was determined " to not be a danger to himself or others.          Discharge Medications:     Current Discharge Medication List      START taking these medications    Details   hydrOXYzine (ATARAX) 25 MG tablet Take 1-2 tablets (25-50 mg) by mouth every 4 hours as needed for anxiety  Qty: 120 tablet, Refills: 1    Associated Diagnoses: Anxiety      !! lamoTRIgine (LAMICTAL) 25 MG tablet Take 1 tablet (25 mg) by mouth daily  Qty: 7 tablet, Refills: 0    Associated Diagnoses: Schizoaffective disorder, bipolar type (H)      !! lamoTRIgine (LAMICTAL) 25 MG tablet Take 2 tablets (50 mg) by mouth daily  Qty: 60 tablet, Refills: 0    Associated Diagnoses: Schizoaffective disorder, bipolar type (H)      ondansetron (ZOFRAN-ODT) 4 MG ODT tab Take 1 tablet (4 mg) by mouth every 6 hours as needed for nausea or vomiting  Qty: 120 tablet, Refills: 0    Associated Diagnoses: Nausea and vomiting, intractability of vomiting not specified, unspecified vomiting type      ranitidine (ZANTAC) 150 MG tablet Take 1 tablet (150 mg) by mouth 2 times daily  Qty: 30 tablet, Refills: 0    Associated Diagnoses: Nausea and vomiting, intractability of vomiting not specified, unspecified vomiting type       !! - Potential duplicate medications found. Please discuss with provider.      CONTINUE these medications which have NOT CHANGED    Details   !! benztropine (COGENTIN) 0.5 MG tablet Take 0.5 mg by mouth At Bedtime      escitalopram (LEXAPRO) 20 MG tablet Take 20 mg by mouth daily      OLANZapine (ZYPREXA) 2.5 MG tablet Take 1-2 tablets by mouth two times daily as needed      risperiDONE (RISPERDAL) 3 MG tablet Take 6 mg by mouth At Bedtime      !! benztropine (COGENTIN) 0.5 MG tablet Take 0.5 mg by mouth daily as needed (EPS)        !! - Potential duplicate medications found. Please discuss with provider.               Psychiatric Examination:   Appearance:  awake, alert and adequately groomed  Attitude:  cooperative  Eye Contact:  good  Mood:  good  Affect:   "appropriate and in normal range  Speech:  clear, coherent  Psychomotor Behavior:  no evidence of tardive dyskinesia, dystonia, or tics  Thought Process:  logical, linear and goal oriented  Associations:  no loose associations  Thought Content:  no evidence of suicidal ideation or homicidal ideation and auditory hallucinations present Patient describes as \"watered down\".   Insight:  limited  Judgment:  limited  Oriented to:  time, person, and place  Attention Span and Concentration:  fair  Recent and Remote Memory:  fair  Language: Able to name objects, Able to repeat phrases and Able to read and write  Fund of Knowledge: low-normal  Muscle Strength and Tone: normal  Gait and Station: Normal         Discharge Plan:   Behavioral Discharge Planning and Instructions        Summary: You were admitted on 8/20/2019 to Station [address] [place] [address] due to suicidal ideation. You were treated by Debra Naegele, APRN, CNS and discharged on 08/26/2019 to Home     Principal Diagnosis:   Schizoaffective disorder, bipolar type.   Attention deficit hyperactivity disorder.   OCD by history.   History of oppositional defiant disorder.   Alcohol use disorder, mild.   Cannabis use disorder, mild.   Cigarette nicotine dependence, currently in withdrawal.         Health Care Follow-up Appointments:   Navigate Program Intake appointment  Date: 09/17/2019  Time: 1:00PM      Provider: Merari Prince  Address: [facility] [address] [facility] [address] [place] [address]   Phone: [phone]          Attend all scheduled appointments with your outpatient providers. Call at least 24 hours in advance if you need to reschedule an appointment to ensure continued access to your outpatient providers.   Major Treatments, Procedures and Findings: You were provided with: a psychiatric assessment, assessed for medical stability, medication evaluation and/or management, group therapy, art therapy, milieu management, medical interventions and " "skills/OT groups.     Symptoms to Report: If you experience any of the following symptoms please report them right away to your provider or to family/friends; feeling more aggressive, increased confusion, losing more sleep, mood getting worse or thoughts of suicide.     Early warning signs can include: Early warning signs that could signal a potential relapse could include but not limited to the following; increased depression or anxiety sleep disturbances increased thoughts or behaviors of suicide or self-harm increased unusual thinking, such as paranoia or hearing voices.      Safety and Wellness:  Take all medicines as directed.  Make no changes unless your doctor suggests them. Follow treatment recommendations.  Refrain from alcohol and non-prescribed drugs. If there is a concern for safety, call 911.     Resources: Mental health crisis response for your county is offered 24 hours a day, 7 days a week. A trained counselor will assess your current situation, offer support and counseling and connect you with local resources. Please call  [facility] Mental Health Crisis Team:  [phone]     Crisis Intervention: 414.844.9730 or [phone] (TTY: 374.825.1130).  Call anytime for help.  National [place] on Mental Illness (www.[place].seferino.org): 855.203.5360 or 600-501-4816.  Alcoholics Anonymous (www.alcoholics-anonymous.org): Check your phone book for your local chapter.  Suicide Awareness Voices of Education (SAVE) (www.save.org): 350-317-QSAS (7383)  National Suicide Prevention Line (www.mentalhealthmn.org): 276-312-AYWN (7908)  Mental Health Consumer/Survivor Network of [place] (www.mhcsn.net): 477.112.8292 or 785-544-8434  Mental Health Association of [place] (www.mentalhealth.org): 320.370.5469 or 657-570-8792  Self- Management and Recovery Training., SMART-- Toll free: 531.643.9265  www.WriteLatex  Text 4 Life: txt \"LIFE\" to 83899 for immediate support and crisis intervention  Crisis " "text line: Text \"[place]\" to 106690. Free, confidential, 24/7.     The treatment team has appreciated the opportunity to work with you. Bertin, please take care and make your recovery a daily recovery. If you have any questions or concerns our unit number is [phone].  You will be receiving a follow-up phone call within the next three days from a representative from behavioral health.  You have identified the best phone number to reach you as [phone] (home)             Attestation:  The patient has been seen and evaluated by me,  Debra A. Naegele, RIAZ CNS on 8/27/2019  Discharge summary time > 30 minutes   "

## 2019-08-27 NOTE — PLAN OF CARE
Pt states he ate a banana for supper and has been drinking apple juice which is helping his nausea. No emesis .

## 2019-08-27 NOTE — PROGRESS NOTES
08/26/19 2030   Group Therapy Session   Group Attendance attended group session   Total Time (minutes) 50   Group Type psychotherapeutic   Group Topic Covered other (see comments)   Patient Participation/Contribution cooperative with task;discussed personal experience with topic;listened actively;offered helpful suggestions to peers   Patient participated in psychotherapy group which included a mindfulness activity focusing on the 5 senses and then processing as a group.    Bertin presented in a bright mood, affect congruent. He reported feeling much better after being sick the past couple of days.  He chose to sit at a separate table; however, he was engaged in the activity and initiated thoughtful discussion.

## 2019-08-27 NOTE — PROGRESS NOTES
DISCHARGE:  This RN and pt have reviewed all meds and aftercare plan. All belongings returned. Pt denies SI , anxiety or depression at this time. Pt discharged by cab to home with his aunt Yelitza.  Pt bright and smiling upon DC.

## 2019-08-27 NOTE — PROGRESS NOTES
08/27/19 1015   Dance Movement Therapy   Type of Intervention structured groups   Response participates, initiates socially appropriate   Hours 1

## 2019-09-17 ENCOUNTER — OFFICE VISIT (OUTPATIENT)
Dept: PSYCHIATRY | Facility: CLINIC | Age: 24
End: 2019-09-17
Payer: MEDICARE

## 2019-09-17 DIAGNOSIS — F25.0 SCHIZOAFFECTIVE DISORDER, BIPOLAR TYPE (H): Primary | ICD-10-CM

## 2019-09-17 ASSESSMENT — ANXIETY QUESTIONNAIRES
7. FEELING AFRAID AS IF SOMETHING AWFUL MIGHT HAPPEN: MORE THAN HALF THE DAYS
5. BEING SO RESTLESS THAT IT IS HARD TO SIT STILL: SEVERAL DAYS
GAD7 TOTAL SCORE: 11
6. BECOMING EASILY ANNOYED OR IRRITABLE: MORE THAN HALF THE DAYS
1. FEELING NERVOUS, ANXIOUS, OR ON EDGE: SEVERAL DAYS
2. NOT BEING ABLE TO STOP OR CONTROL WORRYING: MORE THAN HALF THE DAYS
4. TROUBLE RELAXING: SEVERAL DAYS
3. WORRYING TOO MUCH ABOUT DIFFERENT THINGS: MORE THAN HALF THE DAYS

## 2019-09-17 ASSESSMENT — PATIENT HEALTH QUESTIONNAIRE - PHQ9: SUM OF ALL RESPONSES TO PHQ QUESTIONS 1-9: 12
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
## 2019-09-18 ASSESSMENT — ANXIETY QUESTIONNAIRES: GAD7 TOTAL SCORE: 11

## 2019-11-07 ENCOUNTER — OFFICE VISIT (OUTPATIENT)
Dept: PSYCHIATRY | Facility: CLINIC | Age: 24
End: 2019-11-07
Payer: MEDICARE

## 2019-11-07 DIAGNOSIS — F25.0 SCHIZOAFFECTIVE DISORDER, BIPOLAR TYPE (H): Primary | ICD-10-CM

## 2019-11-07 NOTE — PATIENT INSTRUCTIONS
Treatment Recommendations:    When the distance is hard to travel from home to [place], you can try to put together services closer to home.      Psychiatry for medication management - [facility]   -Chuck Carrasquillo DNP, RN, CNP (Phone: [phone])    Individual Therapy to learn about psychosis, schizophrenia, and schizoaffective disorder and how to cope  -You have an appointment at [facility] (Phone: [phone])  -Have the therapist call me, KENDALL Scott [facility] NAVIGATE Director (Phone: [phone]) to learn about to access NAVIGATE therapy materials to help treat psychosis    [facility]  to help coordinate resources and services  -You were assigned a  from [facility] (Phone: [phone])  -Ask your  for help with:  BRENNAN Sanchez   ARM Worker (Adult Rehabilitative Mental Health Services Worker)  ILS Worker (Independent Living Skills Worker)  Independent Living Housing Options  PCA (Personal Care Attendant)    Family Support Services  -MAHENDRA CARRERA has a  who is a Family , Liliana Valentino CFPS (Phone: [phone]) who is knowledgeable about family support resources in the community and you can call her for support if you as a family member are having a tough time navigating services or providing support to Bertin

## 2019-11-07 NOTE — PROGRESS NOTES
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 requiring hospitalization.     RTC: None, plan is to continue current services near home. Above information was providing to patient/family in writing via AVS.    KENDALL Joseph   NAVIGATE

## 2019-11-08 ASSESSMENT — ANXIETY QUESTIONNAIRES
1. FEELING NERVOUS, ANXIOUS, OR ON EDGE: MORE THAN HALF THE DAYS
4. TROUBLE RELAXING: MORE THAN HALF THE DAYS
5. BEING SO RESTLESS THAT IT IS HARD TO SIT STILL: MORE THAN HALF THE DAYS
GAD7 TOTAL SCORE: 14
6. BECOMING EASILY ANNOYED OR IRRITABLE: MORE THAN HALF THE DAYS
7. FEELING AFRAID AS IF SOMETHING AWFUL MIGHT HAPPEN: MORE THAN HALF THE DAYS
3. WORRYING TOO MUCH ABOUT DIFFERENT THINGS: MORE THAN HALF THE DAYS
2. NOT BEING ABLE TO STOP OR CONTROL WORRYING: MORE THAN HALF THE DAYS

## 2019-11-08 ASSESSMENT — PATIENT HEALTH QUESTIONNAIRE - PHQ9: SUM OF ALL RESPONSES TO PHQ QUESTIONS 1-9: 13

## 2019-11-09 ASSESSMENT — ANXIETY QUESTIONNAIRES: GAD7 TOTAL SCORE: 14

## 2022-04-28 NOTE — PROGRESS NOTES
INITIAL OT ASSESSMENT       08/21/19 1600   General Information   Date Initially Attended OT 08/21/19   Clinical Impression   Affect Appropriate to situation   Orientation Oriented to person, place and time   Appearance and ADLs General cleanliness observed in most areas   Attention to Internal Stimuli No observed signs   Interaction Skills Initiates appropriately with staff;Initiates appropriately with peers   Ability to Communicate Needs Independent   Verbal Content Articulate;Clear;Appropriate to topic   Ability to Maintain Boundaries Maintains appropriate physical boundaries;Accepts and maintains boundaries with one cue   Participation Independently participates   Concentration Concentrates 20-30 minutes   Ability to Concentrate With structure   Follows and Comprehends Directions Independently follows multi-step directions   Memory Delayed and immediate recall intact   Organization Independently organizes medium tasks   Decision Making Independent   Planning and Problem Solving Needs further assessment   Ability to Apply and Learn Concepts Needs further assessment   Frustrations / Stress Tolerance Independently identifies sources of frustration/stress;Independently identifies skills    Level of Insight Some insight   Self Esteem Can identify positives;Takes risks with support and encouragement;Accepts positive feedback   Social Supports Has knowledge of support systems      ED MD

## 2023-03-21 ENCOUNTER — HOSPITAL ENCOUNTER (EMERGENCY)
Facility: CLINIC | Age: 28
Discharge: HOME | End: 2023-03-21
Attending: EMERGENCY MEDICINE | Admitting: EMERGENCY MEDICINE
Payer: MEDICARE

## 2023-03-21 VITALS
HEART RATE: 93 BPM | TEMPERATURE: 98.4 F | RESPIRATION RATE: 20 BRPM | DIASTOLIC BLOOD PRESSURE: 93 MMHG | OXYGEN SATURATION: 99 % | SYSTOLIC BLOOD PRESSURE: 120 MMHG

## 2023-03-21 DIAGNOSIS — K52.9 GASTROENTERITIS: ICD-10-CM

## 2023-03-21 LAB
ALBUMIN SERPL BCG-MCNC: 5.3 G/DL (ref 3.5–5.2)
ALP SERPL-CCNC: 75 U/L (ref 40–129)
ALT SERPL W P-5'-P-CCNC: 14 U/L (ref 10–50)
ANION GAP SERPL CALCULATED.3IONS-SCNC: 13 MMOL/L (ref 7–15)
AST SERPL W P-5'-P-CCNC: 48 U/L (ref 10–50)
BASOPHILS # BLD AUTO: 0 10E3/UL (ref 0–0.2)
BASOPHILS NFR BLD AUTO: 0 %
BILIRUB DIRECT SERPL-MCNC: <0.2 MG/DL (ref 0–0.3)
BILIRUB SERPL-MCNC: 0.3 MG/DL
BUN SERPL-MCNC: 29.4 MG/DL (ref 6–20)
CALCIUM SERPL-MCNC: 9.8 MG/DL (ref 8.6–10)
CHLORIDE SERPL-SCNC: 91 MMOL/L (ref 98–107)
CREAT SERPL-MCNC: 1.34 MG/DL (ref 0.67–1.17)
DEPRECATED HCO3 PLAS-SCNC: 32 MMOL/L (ref 22–29)
EOSINOPHIL # BLD AUTO: 0 10E3/UL (ref 0–0.7)
EOSINOPHIL NFR BLD AUTO: 0 %
ERYTHROCYTE [DISTWIDTH] IN BLOOD BY AUTOMATED COUNT: 15.2 % (ref 10–15)
GFR SERPL CREATININE-BSD FRML MDRD: 75 ML/MIN/1.73M2
GLUCOSE SERPL-MCNC: 106 MG/DL (ref 70–99)
HCT VFR BLD AUTO: 41.9 % (ref 40–53)
HGB BLD-MCNC: 13.6 G/DL (ref 13.3–17.7)
HOLD SPECIMEN: NORMAL
HOLD SPECIMEN: NORMAL
IMM GRANULOCYTES # BLD: 0 10E3/UL
IMM GRANULOCYTES NFR BLD: 0 %
LIPASE SERPL-CCNC: 12 U/L (ref 13–60)
LYMPHOCYTES # BLD AUTO: 0.9 10E3/UL (ref 0.8–5.3)
LYMPHOCYTES NFR BLD AUTO: 12 %
MCH RBC QN AUTO: 23.7 PG (ref 26.5–33)
MCHC RBC AUTO-ENTMCNC: 32.5 G/DL (ref 31.5–36.5)
MCV RBC AUTO: 73 FL (ref 78–100)
MONOCYTES # BLD AUTO: 0.4 10E3/UL (ref 0–1.3)
MONOCYTES NFR BLD AUTO: 6 %
NEUTROPHILS # BLD AUTO: 6.2 10E3/UL (ref 1.6–8.3)
NEUTROPHILS NFR BLD AUTO: 82 %
NRBC # BLD AUTO: 0 10E3/UL
NRBC BLD AUTO-RTO: 0 /100
PLATELET # BLD AUTO: 263 10E3/UL (ref 150–450)
POTASSIUM SERPL-SCNC: 3.6 MMOL/L (ref 3.4–5.3)
PROT SERPL-MCNC: 8.3 G/DL (ref 6.4–8.3)
RBC # BLD AUTO: 5.74 10E6/UL (ref 4.4–5.9)
SODIUM SERPL-SCNC: 136 MMOL/L (ref 136–145)
WBC # BLD AUTO: 7.6 10E3/UL (ref 4–11)

## 2023-03-21 PROCEDURE — 85025 COMPLETE CBC W/AUTO DIFF WBC: CPT | Performed by: EMERGENCY MEDICINE

## 2023-03-21 PROCEDURE — 99284 EMERGENCY DEPT VISIT MOD MDM: CPT | Mod: 25

## 2023-03-21 PROCEDURE — 83690 ASSAY OF LIPASE: CPT | Performed by: EMERGENCY MEDICINE

## 2023-03-21 PROCEDURE — 96374 THER/PROPH/DIAG INJ IV PUSH: CPT

## 2023-03-21 PROCEDURE — 250N000011 HC RX IP 250 OP 636: Performed by: EMERGENCY MEDICINE

## 2023-03-21 PROCEDURE — 82248 BILIRUBIN DIRECT: CPT | Performed by: EMERGENCY MEDICINE

## 2023-03-21 PROCEDURE — 96361 HYDRATE IV INFUSION ADD-ON: CPT

## 2023-03-21 PROCEDURE — 36415 COLL VENOUS BLD VENIPUNCTURE: CPT | Performed by: EMERGENCY MEDICINE

## 2023-03-21 PROCEDURE — 258N000003 HC RX IP 258 OP 636: Performed by: EMERGENCY MEDICINE

## 2023-03-21 PROCEDURE — 80053 COMPREHEN METABOLIC PANEL: CPT | Performed by: EMERGENCY MEDICINE

## 2023-03-21 RX ORDER — SODIUM CHLORIDE 9 MG/ML
INJECTION, SOLUTION INTRAVENOUS CONTINUOUS
Status: DISCONTINUED | OUTPATIENT
Start: 2023-03-21 | End: 2023-03-21 | Stop reason: HOSPADM

## 2023-03-21 RX ORDER — ONDANSETRON 4 MG/1
4 TABLET, ORALLY DISINTEGRATING ORAL EVERY 6 HOURS PRN
Qty: 10 TABLET | Refills: 0 | Status: SHIPPED | OUTPATIENT
Start: 2023-03-21 | End: 2023-03-24

## 2023-03-21 RX ORDER — ONDANSETRON 2 MG/ML
4 INJECTION INTRAMUSCULAR; INTRAVENOUS EVERY 30 MIN PRN
Status: DISCONTINUED | OUTPATIENT
Start: 2023-03-21 | End: 2023-03-21 | Stop reason: HOSPADM

## 2023-03-21 RX ORDER — ONDANSETRON 4 MG/1
4 TABLET, ORALLY DISINTEGRATING ORAL ONCE
Status: DISCONTINUED | OUTPATIENT
Start: 2023-03-21 | End: 2023-03-21

## 2023-03-21 RX ADMIN — ONDANSETRON 4 MG: 2 INJECTION INTRAMUSCULAR; INTRAVENOUS at 19:29

## 2023-03-21 RX ADMIN — SODIUM CHLORIDE 1000 ML: 9 INJECTION, SOLUTION INTRAVENOUS at 19:29

## 2023-03-21 ASSESSMENT — ACTIVITIES OF DAILY LIVING (ADL)
ADLS_ACUITY_SCORE: 35
ADLS_ACUITY_SCORE: 33

## 2023-03-21 ASSESSMENT — ENCOUNTER SYMPTOMS
DIARRHEA: 0
ABDOMINAL PAIN: 1
VOMITING: 1

## 2023-03-21 NOTE — ED TRIAGE NOTES
"Nausea and vomiting for \"the past few days\" since his Invega injection was switched to pill form. Unable to state when it was changed but says it was \"not too long ago.\" Hx schizophrenia. Unable to keep any food or fluids down. ABCs intact.     ODT zofran given en route.  "

## 2023-03-22 NOTE — ED PROVIDER NOTES
History     Chief Complaint:  Nausea & Vomiting    The history is provided by the patient.      Bertin Medina is a 27 year old male who presents with vomiting and abdominal pain. For the past 3 days he has had midline abdominal pain with nausea and vomiting. He has not been able to eat and drink normally. He reports decreased urine output and no bowel movement recently.     Independent Historian:   None - Patient Only    Review of External Notes: Care Everywhere      ROS:  Review of Systems   Gastrointestinal: Positive for abdominal pain and vomiting. Negative for diarrhea.   Genitourinary: Positive for decreased urine volume.   All other systems reviewed and are negative.    Allergies:  There are no known allergies.      Medications:    Ativan  Risperdal  Lexapro  Wellbutrin  Compazine     Past Medical History:    Bipolar I disorder  Depression  Schizo affective disorder   OCD  ADD  Anxiety     Family History:    Father: high blood pressure  Mother: depression, high blood pressure  Brother: depression    Social History:  He reports to the ED with a female friend.   PCP: [facility], [place]     Physical Exam     Patient Vitals for the past 24 hrs:   BP Temp Temp src Pulse Resp SpO2   03/21/23 1945 (!) 120/93 -- -- 93 -- 99 %   03/21/23 1246 111/73 98.4  F (36.9  C) Temporal 90 20 96 %        Physical Exam  Constitutional: Patient is well appearing. No distress.  Head: Atraumatic.  Eyes: Conjunctivae  are normal. No scleral icterus.  Neck: Normal range of motion. Neck supple.   Cardiovascular: Normal rate, regular rhythm, normal heart sounds and intact distal perfusion.   Pulmonary/Chest: Breath sounds normal. No respiratory distress.  Abdominal: Soft. Bowel sounds are normal. No distension. No tenderness. No rebound or guarding.   Musculoskeletal: Normal range of motion. No edema or tenderness.   Neurological: Alert and orientated to person, place, and time. No observable focal neuro deficit  Skin: Warm and  dry. No rash noted. Not diaphoretic.     Emergency Department Course     Laboratory:  Labs Ordered and Resulted from Time of ED Arrival to Time of ED Departure   BASIC METABOLIC PANEL - Abnormal       Result Value    Sodium 136      Potassium 3.6      Chloride 91 (*)     Carbon Dioxide (CO2) 32 (*)     Anion Gap 13      Urea Nitrogen 29.4 (*)     Creatinine 1.34 (*)     Calcium 9.8      Glucose 106 (*)     GFR Estimate 75     CBC WITH PLATELETS AND DIFFERENTIAL - Abnormal    WBC Count 7.6      RBC Count 5.74      Hemoglobin 13.6      Hematocrit 41.9      MCV 73 (*)     MCH 23.7 (*)     MCHC 32.5      RDW 15.2 (*)     Platelet Count 263      % Neutrophils 82      % Lymphocytes 12      % Monocytes 6      % Eosinophils 0      % Basophils 0      % Immature Granulocytes 0      NRBCs per 100 WBC 0      Absolute Neutrophils 6.2      Absolute Lymphocytes 0.9      Absolute Monocytes 0.4      Absolute Eosinophils 0.0      Absolute Basophils 0.0      Absolute Immature Granulocytes 0.0      Absolute NRBCs 0.0     HEPATIC FUNCTION PANEL - Abnormal    Protein Total 8.3      Albumin 5.3 (*)     Bilirubin Total 0.3      Alkaline Phosphatase 75      AST 48      ALT 14      Bilirubin Direct <0.20     LIPASE - Abnormal    Lipase 12 (*)       Emergency Department Course & Assessments:    Interventions:  1929 NS 1000 mL IV  1929 Zofran 4 mg IV      Assessments:  1920 I obtained history and examined the patient as noted above.   2029 I rechecked the patient and explained findings.     Independent Interpretation (X-rays, CTs, rhythm strip):  None    Consultations/Discussion of Management or Tests:  None     Social Determinants of Health affecting care:   None    Disposition:  The patient was discharged to home.     Impression & Plan      Medical Decision Making:  Bertin Medina is a 27 year old male who presents for evaluation of nausea and vomiting with mild abdominal pain in a nonfocal abdominal exam.  There are no known ill contacts.   I considered a broad differential diagnosis for this patient including viral gastroenteritis, bacterial infection of the large intestine (salmonella, shigella, campylobacter, e coli, etc), bowel obstruction, intra-abdominal infection such as colitis, food poisoning, cholecystitis, UTI, pyelonephritis, appendicitis, etc.  There are no signs of worrisome intra-abdominal pathologies detected during the visit today.  He has a completely benign abdominal exam without rebound, guarding, or marked tenderness to palpation.  Supportive outpatient management is therefore indicated.  Abdominal pain precautions are given for home.   No indication for stool studies at this time.  No indication for CT at this time.  He passed oral challenge here in ED. It was discussed to return to the ED for blood in stool, increasing pain, or fevers more than 102.   Feels much improved after interventions in ED. All questions and concerns were answered. The patient was discharged home and recommended to follow up with his primary physician and return with any new or worsening symptoms.     Diagnosis:    ICD-10-CM    1. Gastroenteritis  K52.9            Discharge Medications:  New Prescriptions    ONDANSETRON (ZOFRAN ODT) 4 MG ODT TAB    Take 1 tablet (4 mg) by mouth every 6 hours as needed for nausea      Scribe Disclosure:   I, AGATHA EMMARIA DNOEMI, am serving as a scribe; to document services personally performed by Oscar Cedillo MD based on data collection and the provider's statements to me.     3/21/2023   Oscar Cedillo MD Stevens, Andrew C, MD  03/21/23 2052

## 2024-01-06 ENCOUNTER — TELEPHONE (OUTPATIENT)
Dept: BEHAVIORAL HEALTH | Facility: CLINIC | Age: 29
End: 2024-01-06

## 2024-01-06 ENCOUNTER — HOSPITAL ENCOUNTER (EMERGENCY)
Facility: CLINIC | Age: 29
Discharge: HOME | End: 2024-01-09
Attending: EMERGENCY MEDICINE | Admitting: EMERGENCY MEDICINE
Payer: MEDICARE

## 2024-01-06 DIAGNOSIS — F20.9 SCHIZOPHRENIA, UNSPECIFIED TYPE (H): Primary | ICD-10-CM

## 2024-01-06 DIAGNOSIS — F25.0 SCHIZOAFFECTIVE DISORDER, BIPOLAR TYPE (H): ICD-10-CM

## 2024-01-06 DIAGNOSIS — Z91.148 NONCOMPLIANCE WITH MEDICATION REGIMEN: ICD-10-CM

## 2024-01-06 PROBLEM — F31.9 AFFECTIVE BIPOLAR DISORDER (H): Status: ACTIVE | Noted: 2019-08-11

## 2024-01-06 LAB
ALBUMIN SERPL BCG-MCNC: 4.4 G/DL (ref 3.5–5.2)
ALP SERPL-CCNC: 64 U/L (ref 40–150)
ALT SERPL W P-5'-P-CCNC: 14 U/L (ref 0–70)
AMPHETAMINES UR QL SCN: ABNORMAL
ANION GAP SERPL CALCULATED.3IONS-SCNC: 11 MMOL/L (ref 7–15)
AST SERPL W P-5'-P-CCNC: 20 U/L (ref 0–45)
BARBITURATES UR QL SCN: ABNORMAL
BASOPHILS # BLD AUTO: 0 10E3/UL (ref 0–0.2)
BASOPHILS NFR BLD AUTO: 0 %
BENZODIAZ UR QL SCN: ABNORMAL
BILIRUB SERPL-MCNC: 0.3 MG/DL
BUN SERPL-MCNC: 22.6 MG/DL (ref 6–20)
BZE UR QL SCN: ABNORMAL
CALCIUM SERPL-MCNC: 9.2 MG/DL (ref 8.6–10)
CANNABINOIDS UR QL SCN: ABNORMAL
CHLORIDE SERPL-SCNC: 102 MMOL/L (ref 98–107)
CREAT SERPL-MCNC: 1.24 MG/DL (ref 0.67–1.17)
DEPRECATED HCO3 PLAS-SCNC: 27 MMOL/L (ref 22–29)
EGFRCR SERPLBLD CKD-EPI 2021: 82 ML/MIN/1.73M2
EOSINOPHIL # BLD AUTO: 0 10E3/UL (ref 0–0.7)
EOSINOPHIL NFR BLD AUTO: 0 %
ERYTHROCYTE [DISTWIDTH] IN BLOOD BY AUTOMATED COUNT: 15.4 % (ref 10–15)
ETHANOL SERPL-MCNC: <0.01 G/DL
FENTANYL UR QL: ABNORMAL
GLUCOSE SERPL-MCNC: 123 MG/DL (ref 70–99)
HCT VFR BLD AUTO: 38.9 % (ref 40–53)
HGB BLD-MCNC: 12.8 G/DL (ref 13.3–17.7)
IMM GRANULOCYTES # BLD: 0 10E3/UL
IMM GRANULOCYTES NFR BLD: 0 %
LYMPHOCYTES # BLD AUTO: 1.4 10E3/UL (ref 0.8–5.3)
LYMPHOCYTES NFR BLD AUTO: 20 %
MCH RBC QN AUTO: 24.2 PG (ref 26.5–33)
MCHC RBC AUTO-ENTMCNC: 32.9 G/DL (ref 31.5–36.5)
MCV RBC AUTO: 74 FL (ref 78–100)
MONOCYTES # BLD AUTO: 0.5 10E3/UL (ref 0–1.3)
MONOCYTES NFR BLD AUTO: 7 %
NEUTROPHILS # BLD AUTO: 4.9 10E3/UL (ref 1.6–8.3)
NEUTROPHILS NFR BLD AUTO: 73 %
NRBC # BLD AUTO: 0 10E3/UL
NRBC BLD AUTO-RTO: 0 /100
OPIATES UR QL SCN: ABNORMAL
PCP QUAL URINE (ROCHE): ABNORMAL
PLATELET # BLD AUTO: 297 10E3/UL (ref 150–450)
POTASSIUM SERPL-SCNC: 4.1 MMOL/L (ref 3.4–5.3)
PROT SERPL-MCNC: 7.3 G/DL (ref 6.4–8.3)
RBC # BLD AUTO: 5.29 10E6/UL (ref 4.4–5.9)
SARS-COV-2 RNA RESP QL NAA+PROBE: POSITIVE
SODIUM SERPL-SCNC: 140 MMOL/L (ref 135–145)
WBC # BLD AUTO: 6.9 10E3/UL (ref 4–11)

## 2024-01-06 PROCEDURE — 87635 SARS-COV-2 COVID-19 AMP PRB: CPT | Performed by: EMERGENCY MEDICINE

## 2024-01-06 PROCEDURE — 80053 COMPREHEN METABOLIC PANEL: CPT | Performed by: EMERGENCY MEDICINE

## 2024-01-06 PROCEDURE — 82077 ASSAY SPEC XCP UR&BREATH IA: CPT | Performed by: EMERGENCY MEDICINE

## 2024-01-06 PROCEDURE — 36415 COLL VENOUS BLD VENIPUNCTURE: CPT | Performed by: EMERGENCY MEDICINE

## 2024-01-06 PROCEDURE — 85041 AUTOMATED RBC COUNT: CPT | Performed by: EMERGENCY MEDICINE

## 2024-01-06 PROCEDURE — 80307 DRUG TEST PRSMV CHEM ANLYZR: CPT | Performed by: EMERGENCY MEDICINE

## 2024-01-06 PROCEDURE — 250N000013 HC RX MED GY IP 250 OP 250 PS 637: Performed by: EMERGENCY MEDICINE

## 2024-01-06 PROCEDURE — 99291 CRITICAL CARE FIRST HOUR: CPT | Mod: 25

## 2024-01-06 RX ORDER — OLANZAPINE 5 MG/1
10 TABLET, ORALLY DISINTEGRATING ORAL 2 TIMES DAILY
Status: DISCONTINUED | OUTPATIENT
Start: 2024-01-07 | End: 2024-01-09 | Stop reason: HOSPADM

## 2024-01-06 RX ORDER — OLANZAPINE 5 MG/1
10 TABLET, ORALLY DISINTEGRATING ORAL ONCE
Status: COMPLETED | OUTPATIENT
Start: 2024-01-06 | End: 2024-01-06

## 2024-01-06 RX ORDER — LANOLIN ALCOHOL/MO/W.PET/CERES
3 CREAM (GRAM) TOPICAL
COMMUNITY

## 2024-01-06 RX ORDER — OLANZAPINE 5 MG/1
10 TABLET, ORALLY DISINTEGRATING ORAL 2 TIMES DAILY PRN
Status: DISCONTINUED | OUTPATIENT
Start: 2024-01-06 | End: 2024-01-06

## 2024-01-06 RX ORDER — PALIPERIDONE 3 MG/1
6 TABLET, EXTENDED RELEASE ORAL DAILY
Status: DISCONTINUED | OUTPATIENT
Start: 2024-01-06 | End: 2024-01-08

## 2024-01-06 RX ORDER — PALIPERIDONE 6 MG/1
6 TABLET, EXTENDED RELEASE ORAL DAILY
COMMUNITY
Start: 2023-12-19

## 2024-01-06 RX ADMIN — OLANZAPINE 10 MG: 5 TABLET, ORALLY DISINTEGRATING ORAL at 11:00

## 2024-01-06 RX ADMIN — OLANZAPINE 10 MG: 5 TABLET, ORALLY DISINTEGRATING ORAL at 21:17

## 2024-01-06 ASSESSMENT — ACTIVITIES OF DAILY LIVING (ADL)
ADLS_ACUITY_SCORE: 35

## 2024-01-06 NOTE — ED NOTES
"Patient reluctant to take Zyprexa, patient states he wants to \"talk to the Psychologist first.\" Patient went back and fourth and eventually agreed to take Zyprexa at this time.     Patient's Mom called asking if patient is in our emergency department. Patient states he does not want her to know that he is here.     Patient meeting with DEC at this time.   "

## 2024-01-06 NOTE — ED NOTES
IP MH Referral Acuity Rating Score (RARS)    LMHP complete at referral to IP MH, with DEC; and, daily while awaiting IP MH placement. Call score to PPS.  CRITERIA SCORING   New 72 HH and Involuntary for IP MH (not adolescent) 0/1   Boarding over 24 hours 0/1   Vulnerable adult at least 55+ with multiple co morbidities; or, Patient age 11 or under 0/1   Suicide ideation without relief of precipitating factors 0/1   Current plan for suicide 0/1   Current plan for homicide 0/1   Imminent risk or actual attempt to seriously harm another without relief of factors precipitating the attempt 0/1   Severe dysfunction in daily living (ex: complete neglect for self care, extreme disruption in vegetative function, extreme deterioration in social interactions) 0/1   Recent (last 2 weeks) or current physical aggression in the ED 0/1   Restraints or seclusion episode in ED 0/1   Verbal aggression, agitation, yelling, etc., while in the ED 0/1   Active psychosis with psychomotor agitation or catatonia 0/1   Need for constant or near constant redirection (from leaving, from others, etc).  0/1   Intrusive or disruptive behaviors 0/1   TOTAL Acuity Total Score: 0

## 2024-01-06 NOTE — TELEPHONE ENCOUNTER
S:  Writer called and spoke with pt's RN, reminding that we still needed Utox.    Pt is low on the list due to his [facility] assessment acuity score.  If pt is cooperative, willing to mask and isolate, admission can be looked at when a private bed is avlb.

## 2024-01-06 NOTE — ED NOTES
Assumed care at 1300. Pt is calmly sitting in a chair in his room playing with his basketball. Denies further needs at this time.

## 2024-01-06 NOTE — ED TRIAGE NOTES
"Patient arrives by EMS on a transport hold placed by [employer]. Pt was as Aunt house, Aunt called 911 due to concern for patient not sleeping for the past 3 days, self harm \"hitting himself in the back and chest.\" Reported HX: Schizophrenia and bi-polar. Off meds for the last few months. Pt hyper verbal, rambling upon arrival. A&OX3.      Triage Assessment (Adult)       Row Name 01/06/24 1031          Triage Assessment    Airway WDL WDL        Respiratory WDL    Respiratory WDL WDL        Skin Circulation/Temperature WDL    Skin Circulation/Temperature WDL WDL        Cardiac WDL    Cardiac WDL WDL        Peripheral/Neurovascular WDL    Peripheral Neurovascular WDL WDL        Cognitive/Neuro/Behavioral WDL    Cognitive/Neuro/Behavioral WDL X;mood/behavior     Arousal Level opens eyes spontaneously     Mood/Behavior anxious        [place]ridge Coma Scale    Best Eye Response 4-->(E4) spontaneous     Best Motor Response 6-->(M6) obeys commands     Best Verbal Response 5-->(V5) oriented     Owen Coma Scale Score 15                     "

## 2024-01-06 NOTE — ED NOTES
ED VISIT ADDENDUM:    The care of this patient was signed out to me at shift change by Dr Johnson.  Patient is mildly dysregulated due to medication noncompliance, although he has filled his paliperidone ER 6 mg every month until August.  He is calm and cooperative and pending DEC assessment.  We will restart this medication, the patient is feeling improved after Zyprexa 10 mg ODT.  Of note, the patient is incidentally COVID-positive and is asymptomatic.  Plan DEC assessment and disposition planning from there.    James Neville MD  [facility]  [facility] Emergency Department         James Neville MD  01/06/24 2148

## 2024-01-06 NOTE — ED PROVIDER NOTES
"  History     Chief Complaint:  Psychiatric Evaluation       HPI   Bertin Medina is a 28 year old male presenting to the emergency department for psychiatric evaluation. The patient's aunt called EMS this morning due to concern of the patient not taking psychiatric medication, and as a result has been \"hitting himself in the back and chest\" and not sleeping for the past 3 days. The patient notes that he has not taken his medication for a long time and prefers talking to people in order to help his anxiety. The patient denies auditory and visual hallucinations. He notes marijuana use, but says he has not used marijuana in the past two days. He notes he stopped tobacco use on July 22, 2023, and he denies other drug use.      Independent Historian:   EMS provide history that the patient's aunt was concerned as he is having bizarre behavior with response to internal stimuli.    Review of External Notes:   Office visit reviewed from October 27, 2022 and the patient was seen for vomiting.      Medications:    Cogentin   Lexapro  Atarax  Lamictal  Zyprexa  Zophran  Zantac  Risperdal  Invega  Ativan  Lithium carbonate  Wellbutrin  Compazine      Past Medical History:    Bipolar 1 disorder  Depression  Schizo affective schizophrenia   OCD  ADD  Oppositional defiant disorder   Anxiety    Past Surgical History:    None    Physical Exam   Patient Vitals for the past 24 hrs:   BP Temp Pulse Resp SpO2   01/06/24 1031 (!) 136/96 98.1  F (36.7  C) 94 18 100 %        Physical Exam  Constitutional:       General: He is not in acute distress.     Appearance: Normal appearance. He is not toxic-appearing.   HENT:      Head: Atraumatic.      Right Ear: External ear normal.      Left Ear: External ear normal.      Mouth/Throat:      Pharynx: Oropharynx is clear.   Eyes:      General: No scleral icterus.     Conjunctiva/sclera: Conjunctivae normal.   Cardiovascular:      Rate and Rhythm: Normal rate and regular rhythm.      Heart sounds: " Normal heart sounds.   Pulmonary:      Effort: Pulmonary effort is normal. No respiratory distress.      Breath sounds: Normal breath sounds.   Abdominal:      Palpations: Abdomen is soft.      Tenderness: There is no abdominal tenderness.   Musculoskeletal:         General: No deformity.      Cervical back: Neck supple.   Skin:     General: Skin is warm.      Capillary Refill: Capillary refill takes less than 2 seconds.   Neurological:      General: No focal deficit present.      Mental Status: He is alert and oriented to person, place, and time.   Psychiatric:      Comments: Tangential in his thinking.  Poor insight into his overall condition.  Cooperative.           Emergency Department Course       Laboratory:  Labs Ordered and Resulted from Time of ED Arrival to Time of ED Departure   COMPREHENSIVE METABOLIC PANEL - Abnormal       Result Value    Sodium 140      Potassium 4.1      Carbon Dioxide (CO2) 27      Anion Gap 11      Urea Nitrogen 22.6 (*)     Creatinine 1.24 (*)     GFR Estimate 82      Calcium 9.2      Chloride 102      Glucose 123 (*)     Alkaline Phosphatase 64      AST 20      ALT 14      Protein Total 7.3      Albumin 4.4      Bilirubin Total 0.3     COVID-19 VIRUS (CORONAVIRUS) BY PCR - Abnormal    SARS CoV2 PCR Positive (*)    CBC WITH PLATELETS AND DIFFERENTIAL - Abnormal    WBC Count 6.9      RBC Count 5.29      Hemoglobin 12.8 (*)     Hematocrit 38.9 (*)     MCV 74 (*)     MCH 24.2 (*)     MCHC 32.9      RDW 15.4 (*)     Platelet Count 297      % Neutrophils 73      % Lymphocytes 20      % Monocytes 7      % Eosinophils 0      % Basophils 0      % Immature Granulocytes 0      NRBCs per 100 WBC 0      Absolute Neutrophils 4.9      Absolute Lymphocytes 1.4      Absolute Monocytes 0.5      Absolute Eosinophils 0.0      Absolute Basophils 0.0      Absolute Immature Granulocytes 0.0      Absolute NRBCs 0.0     ETHYL ALCOHOL LEVEL - Normal    Alcohol ethyl <0.01          Emergency Department  Course & Assessments:             Interventions:  Medications   OLANZapine zydis (zyPREXA) ODT tab 10 mg (has no administration in time range)   OLANZapine zydis (zyPREXA) ODT tab 10 mg (10 mg Oral $Given 1/6/24 1100)      Consultations/Discussion of Management or Tests:  Diet       Social Determinants of Health affecting care:   Lives alone.  He does not participate in his care.    Disposition:  Transfer for inpatient psychiatric evaluation    Impression & Plan    Medical Decision Making:  This patient is a 28-year-old man with a history of schizoaffective bipolar disorder who is off his medications.  He has been increasingly tangential in his thinking and responding to internal stimuli.  He is quite disorganized on my exam.  He was placed on an [place] hold.  He is very cooperative.  He took a dose of Zyprexa.  We will verify his medication regimen to restart.    Patient was seen by DEC and involuntary admission was recommended.  He has incidentally screen positive for COVID although he is asymptomatic.  We will reach out to psychiatry again to see whether he will have further care here in the ED or whether he may be appropriate for an inpatient unit.      Diagnosis:    ICD-10-CM    1. Schizoaffective disorder, bipolar type (H)  F25.0       2. Noncompliance with medication regimen  Z91.148              Scribe Disclosure:  I, Tommy Orantes and David Woo, am serving as a scribe at 10:51 AM on 1/6/2024 to document services personally performed by Milad Johnson MD based on my observations and the provider's statements to me.   1/6/2024   Milad Johnson MD McRoberts, Sean Edward, MD  01/06/24 1311

## 2024-01-06 NOTE — ED NOTES
[place] information given to patient. Patient searched by security. Basketball left with patient. Patient agrees to not dribble the ball in the room, states the basketball helps him with his anxiety. Patient able to contract for safety. Denies SI/HI.

## 2024-01-06 NOTE — ED NOTES
Patient asked RN about shower.  RN notified patient that there were not enough staff available at this time.  Patient was offered personal hygiene products and clean scrubs, pt declined.

## 2024-01-06 NOTE — ED NOTES
"Writer spoke to Edel from intake on the phone. Edel states \"as long as he continues to be cooperative and is willing to wear a mask, we will put him on the list for a private room when one becomes available. However, he will be very low on the list.\"  "

## 2024-01-06 NOTE — ED NOTES
"Lunch tray provided, patient eating, calm, cooperative at this time. Patient thanked RN for cares. Urinal given. Patient states, \"can you drug test me? Just so I know?\" Updated patient on needing urine specimen for drug test. Patient verbalized understanding. Pt states, \"Hopefully I can get out of here on Monday so I can go to work.\"     Two skin abrasions noted to right forearm. Pt states he accidentally burnt his right forearm while working at [employer]. Denies pain at this time.   "
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
## 2024-01-06 NOTE — PHARMACY-ADMISSION MEDICATION HISTORY
Pharmacist Admission Medication History    Admission medication history is complete. The information provided in this note is only as accurate as the sources available at the time of the update.    Information Source(s): Family member and CareEverywhere/SureScripts via N/A  Attempted to interview patient but he was sleeping. Reviewed DEC notes and found information from pt's Aunt, as below.     Pertinent Information:     Per DEC note: Aunt states pt has not taken his medications in ~2 months. Was previously on an injectable medication but was changed to oral medication as insurance would not cover the injections.     Based on refill history, pt has been filling paliperidone ER 6mg tablets, every month since August 2023 (although did not get a fill in Sept). Prior to that, had not filled this medication since May 2023. So likely was on injectable Invega between May 2023 and August 2023, although I cannot find any documentation of what the IM medication was.     Changes made to PTA medication list:  Added: paliperidone ER 6mg  Deleted: all other meds were discontinued  Changed: None    Medication Affordability:  Not including over the counter (OTC) medications, was there a time in the past 3 months when you did not take your medications as prescribed because of cost?: Unable to Assess    Allergies reviewed with patient and updates made in EHR: unable to assess    Medication History Completed By:   Betsey ReynosoD, [facility]   Emergency Medicine Pharmacist  [phone] or Jez  January 6, 2024    Prior to Admission medications    Medication Sig Last Dose Taking? Auth Provider Long Term End Date   melatonin 3 MG tablet Take 3 mg by mouth nightly as needed for sleep Unknown at - Yes Unknown, Entered By History     paliperidone ER (INVEGA) 6 MG 24 hr tablet Take 6 mg by mouth daily Unknown at - Yes Unknown, Entered By History Yes
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
## 2024-01-06 NOTE — ED NOTES
Bed: ED06  Expected date: 1/6/24  Expected time:   Means of arrival: Ambulance  Comments:  Quinten Hernández

## 2024-01-06 NOTE — CARE PLAN
Bertin Medina  January 6, 2024  Plan of Care Hand-off Note     Patient Care Path: discharge    Plan for Care:   Upon completion of assessment and in consultation with attending provider the patient circumstances and mental state patient appears to require inpatient care.  This is the recommendation of this clinician that patient be admitted to an inpatient hospital for stabilization.  Patient poses an acute risk to themselves as they cannot appropriately manage her mental health symptoms safely in the community.  Patient has been off his medication for an unknown amount of time and because of this lapse in medication he has become disorganized and paranoid.  Patient will need to safely restart medication in a stable environment and then begin working with outpatient providers once released from the hospital.    Identified Goals and Safety Issues:      Overview:  (P)  [facility]- [phone]            Legal Status: Legal Status at Admission: Voluntary/Patient has signed consent for treatment    Psychiatry Consult: Not requested        Updated   regarding plan of care.           Olga Perez, LICSW

## 2024-01-07 ENCOUNTER — TELEPHONE (OUTPATIENT)
Dept: BEHAVIORAL HEALTH | Facility: CLINIC | Age: 29
End: 2024-01-07
Payer: MEDICARE

## 2024-01-07 PROBLEM — F42.9 OCD (OBSESSIVE COMPULSIVE DISORDER): Status: ACTIVE | Noted: 2019-12-18

## 2024-01-07 PROCEDURE — 250N000013 HC RX MED GY IP 250 OP 250 PS 637: Performed by: EMERGENCY MEDICINE

## 2024-01-07 PROCEDURE — 250N000013 HC RX MED GY IP 250 OP 250 PS 637

## 2024-01-07 RX ORDER — OLANZAPINE 5 MG/1
TABLET, ORALLY DISINTEGRATING ORAL
Status: COMPLETED
Start: 2024-01-07 | End: 2024-01-07

## 2024-01-07 RX ORDER — RISPERIDONE 1 MG/1
1 TABLET ORAL ONCE
Status: COMPLETED | OUTPATIENT
Start: 2024-01-07 | End: 2024-01-07

## 2024-01-07 RX ORDER — RISPERIDONE 1 MG/1
2 TABLET ORAL AT BEDTIME
Status: DISCONTINUED | OUTPATIENT
Start: 2024-01-07 | End: 2024-01-09 | Stop reason: HOSPADM

## 2024-01-07 RX ORDER — RISPERIDONE 1 MG/1
1 TABLET ORAL 2 TIMES DAILY
Status: DISCONTINUED | OUTPATIENT
Start: 2024-01-07 | End: 2024-01-07

## 2024-01-07 RX ADMIN — Medication 1 LOZENGE: at 03:52

## 2024-01-07 RX ADMIN — OLANZAPINE 10 MG: 5 TABLET, ORALLY DISINTEGRATING ORAL at 04:37

## 2024-01-07 RX ADMIN — RISPERIDONE 1 MG: 1 TABLET ORAL at 16:40

## 2024-01-07 ASSESSMENT — ACTIVITIES OF DAILY LIVING (ADL)
ADLS_ACUITY_SCORE: 35

## 2024-01-07 ASSESSMENT — COLUMBIA-SUICIDE SEVERITY RATING SCALE - C-SSRS
2. HAVE YOU ACTUALLY HAD ANY THOUGHTS OF KILLING YOURSELF?: NO
1. SINCE LAST CONTACT, HAVE YOU WISHED YOU WERE DEAD OR WISHED YOU COULD GO TO SLEEP AND NOT WAKE UP?: NO

## 2024-01-07 NOTE — ED NOTES
Pt noted to be coming out of room multiple times during shift. Pt was asked to try and stay in the room more. Estimated to be coming out of room every 10-15, if not more often.      Pt hyper active. Currently playing with basketball in room.    Hide Biopsy Depth?: No Post-Care Instructions: I reviewed with the patient in detail post-care instructions. Patient is to keep the biopsy site dry overnight, and then apply Aquaphor twice daily until healed. Patient may apply hydrogen peroxide soaks to remove any crusting. Additional Anesthesia Volume In Cc (Will Not Render If 0): 0 Biopsy Method: Dermablade Dressing: bandage Anesthesia Type: 1% lidocaine with 1:100,000 epinephrine Was A Bandage Applied: Yes Electrodesiccation And Curettage Text: The wound bed was treated with electrodesiccation and curettage after the biopsy was performed. Wound Care: Aquaphor Type Of Destruction Used: Curettage Electrodesiccation Text: The wound bed was treated with electrodesiccation after the biopsy was performed. Biopsy Type: H and E Depth Of Biopsy: dermis Cryotherapy Text: The wound bed was treated with cryotherapy after the biopsy was performed. Hemostasis: Aluminum Chloride Information: Selecting Yes will display possible errors in your note based on the variables you have selected. This validation is only offered as a suggestion for you. PLEASE NOTE THAT THE VALIDATION TEXT WILL BE REMOVED WHEN YOU FINALIZE YOUR NOTE. IF YOU WANT TO FAX A PRELIMINARY NOTE YOU WILL NEED TO TOGGLE THIS TO 'NO' IF YOU DO NOT WANT IT IN YOUR FAXED NOTE. Anesthesia Volume In Cc (Will Not Render If 0): 1 Notification Instructions: Patient will return to clinic in 2-4 weeks for biopsy results. Silver Nitrate Text: The wound bed was treated with silver nitrate after the biopsy was performed. Consent: Verbal consent was obtained and risks were reviewed including but not limited to scarring, infection, bleeding, scabbing, incomplete removal, nerve damage and allergy to anesthesia. Curettage Text: The wound bed was treated with curettage after the biopsy was performed. Billing Type: Third-Party Bill Detail Level: Detailed

## 2024-01-07 NOTE — TELEPHONE ENCOUNTER
R: [place]  Access Inpatient Bed Call Log  1/7/24 @ 1:00am   Intake has called facilities that have not updated their bed status within the last 12 hours.??     *[place]:  [place] -- [facility]: @ cap per website.  [place] -- [facility]:  @ cap per website.  [place] -- [facility]: @ cap per website.  [place] -- [facility]: @ cap per website. Low acuity only.  [place] -- [facility]: @ cap per website.  [facility] -- [facility]: @ cap per website.   [facility]/ beds - POSTING 3 BEDS. Ages 18-25, Voluntary only, NO aggression/physical/sexual assault, violence hx or drug abuse. Negative Covid.   [place] -- [facility]: @ cap per website.  [place] -- [facility]: @ cap per website.  [place] -- [facility]: @ cap per website. Not reviewing until 10AM.     Pt remains on waitlist pending appropriate placement availability

## 2024-01-07 NOTE — CONSULTS
DEC Consult Order acknowledged. EC reassessment completed today by Sarah Blake [facility].     SHARIF LANE M.Ed., [facility], LADC  Licensed Mental Health Professional  Triage and Transition Services - [facility] [phone]

## 2024-01-07 NOTE — ED NOTES
RN ED Mental Health Handoff Note    [place]    Does patient require 1:1? No    Hold and rights been given and documented for patient: Yes    Is the patient in  scrubs? Yes    Has the patient been searched? Yes    Is the 15 minute observation tool up to date? Yes    Was patient issued a welcome folder? Yes    Room check completed this shift: Yes    PSS3 and [place] Assessment/Reassessment this shift:    PSS-3      Date and Time Over the past 2 weeks have you felt down, depressed, or hopeless? Over the past 2 weeks have you had thoughts of killing yourself? Have you ever attempted to kill yourself? When did this last happen? User   01/06/24 1035 yes no no -- AKR          C-SSRS ([place])      Date and Time Q1 Wished to be Dead (Past Month) Q2 Suicidal Thoughts (Past Month) Q3 Suicidal Thought Method Q4 Suicidal Intent without Specific Plan Q5 Suicide Intent with Specific Plan Q6 Suicide Behavior (Lifetime) Within the Past 3 Months? RETIRED: Level of Risk per Screen Screening Not Complete User   01/06/24 1212 no no -- -- -- -- -- -- -- YTM            Behavioral status of patient: Green    Code 21 called this shift? No    Use of restraints/seclusion this shift? No    Most recent vital signs:  Temp: 98.1  F (36.7  C)   BP: (!) 136/96 Pulse: 94   Resp: 18 SpO2: 100 %        Medications:  Scheduled medication compliance? No, pt wanting to talk to psych or pharmacy before taking any medications    PRN Meds administered this shift? No    Medications   OLANZapine zydis (zyPREXA) ODT tab 10 mg (has no administration in time range)   paliperidone ER (INVEGA) 24 hr tablet 6 mg (6 mg Oral Not Given 1/6/24 1718)   OLANZapine zydis (zyPREXA) ODT tab 10 mg (10 mg Oral $Given 1/6/24 1100)         ADLs    Meal Provided this shift? Yes    Hygiene items provided? Yes    ADLs completed? Yes    Date of last shower: 1/6/23    Any significant events this shift? No    Any information that would be helpful in caring for this  patient?  N/A    Family present/updated? No    Location of patient's belongings: DEC office    Critical Care Minutes:  Does the patient need critical care minutes documented? Yes

## 2024-01-07 NOTE — TELEPHONE ENCOUNTER
R: STATEWIDE     [facility] Access Inpatient Bed Call Log 1/7/2024 7:23 AM    Intake has called facilities that have not updated the bed status within the last 12 hours.                                           [facility] is at capacity                      [facility] is posting 0 beds. [phone]              [facility] is posting 0 beds. Negative covid required                        [facility] is posting 0 beds. Neg covid. No high school or [place]-psych. [phone] Per call at 7:31am to [facility] no beds available today.             [facility] is posting 0 beds. [phone]             [facility] is posting 0 beds. [phone]              [facility] is posting 3 beds. Negative covid. [phone] Per call at 7:32am [facility] for callback.             [facility] is posting 0 beds                     [facility] is posting 0 beds [phone]                   [facility] is posting 3 beds. LOW acuity ONLY. Mixed unit 12+. Negative covid- [phone]             [facility] has 2 bed posted. No aggression. Negative Covid. Low acuity              [facility] is posting 0 beds. Negative covid. [phone] Per call at 7:33am no answer for IP Psych to check back later.             [facility] is posting 0 beds. Low acuity only. Negative covid.  [phone]              [facility] is posting 0 bed. Low acuity. No current aggression. [phone]              [facility] ([place] is posting 3 beds available. Negative covid.  [phone].                 [facility] is posting 0 bed. Low acuity. 72 HH hold preferred. Negative covid required. [phone] Per call at 7:37am to [facility] CLOSED until 1/8/24.             [facility] ([place] is posting 5 beds. Low acuity only. Negative covid.  [phone]              [facility] [place] is posting 7 beds.  Negative covid  required.   Vol only, No history of aggression, violence, or assault. No sexual offenders. No 72 HH holds. [phone]                    [facility] is posting 7 beds. Negative covid required.  (Must have the cognitive ability to do programming. No aggressive or violent behavior or recent HX in the last 2 yrs. MH must be primary.) Always low acuity. [phone] Per call at 7:39am to [facility] low acuity, no Covid and no violence.             [facility] has 0 beds posted. Negative covid required.  Low acuity only. Violence and aggression capped.  [phone]              [facility] is posting 0 beds. Low acuity, Negative covid required. [phone] Per call at 7:40am to [facility] no beds can place on referral list.             [facility] is posting 7 beds. Unit is a combined unit (14+). No aggressive patients. Voluntary only. Must be accompanied by a guardian.  Negative covid. [phone] Per call at 7:39am to [place] beds are available.             [facility] posting 0 beds Negative covid required.  [phone]              [facility] [place] is posting 2 beds. Negative covid. LOW acuity. (No lines, drains, or tubes, oxygen, CPAP, IV, etc.) Must Have a Ride Home. [phone] Per call at 7:42am to [facility] possibly beds available later today.             [facility] is posting 5 beds. Negative covid. (No. lines, drains, or tubes, oxygen, CPAP, IV, etc.). [phone] Per call at 7:44am to [facility] beds avail case by case high acuity.       Pt remains on the work list pending appropriate bed availability.

## 2024-01-07 NOTE — ED NOTES
Pt coming in and out of room every 2 minutes as nurse was with another pt; pt asked to stay in room for time being until nurse can talk with pt. Pt starting doing exercises and being loud and boisterous.

## 2024-01-07 NOTE — ED PROVIDER NOTES
This patient was reassumed in signout today.  His mental status does seem to be improving although he is somewhat tangential and disorganized still.  We requested a repeat DEC evaluation.  The patient was seen and is not felt at this point to be ready for outpatient carry out especially after taking further collateral information from the patient's aunt.    The patient has been taking Zyprexa but refused the Invega.  Will continue Zyprexa overnight tonight and have reevaluation by psychiatry tomorrow.     Milad Johnson MD  01/07/24 9440

## 2024-01-07 NOTE — PROGRESS NOTES
Diagnostic Evaluation Consultation  Crisis Re-Assessment    Patient Name: Bertin Medina  Age:  28 year old  Legal Sex: male  Gender Identity: male  Pronouns:   Race: Black or   Ethnicity: Not  or   Language: English      Patient was assessed: Virtual: [facility] Crisis Assessment Start Time: 1056 Crisis Assessment Stop Time: 1125  Patient location: [facility] EMERGENCY DEPT                             ED06  Date of original assessment: 1/6/2024; completed by KENDALL Paredes    Referral Data and Chief Complaint  Bertin Medina presents to the ED via EMS. Patient is presenting to the ED for the following concerns: Paranoia, Significant behavioral change.   Factors that make the mental health crisis life threatening or complex are:  Patient appears to be experiencing psychotic symptoms in the form of delusional thoughts and papa.  Patient's aunt called 911 as he has been staying with her for the past 3 days but has not been sleeping and has been talking irrationally to himself.  During assessment patient was hyperverbal and had difficulty staying on topic when asked questions directly.  Patient does not appear to be a positive historian regarding his current situation but has some insight that he has not been doing well.  During conversation patient referred to assess her as mom repeatedly and his mother has been dead since he was 3 years old.  Patient denies concerns regarding suicidal ideation and homicidal ideation.  Patient denies all mental health symptoms but reports wanting to be on the right medication so that he can go back to school and go back to work appropriately..    Assessment Methods  Assessment methods included conducting a formal interview with patient, review of medical records, collaboration with medical staff, and obtaining relevant collateral information from family and community providers when available.  : done     Patient response to  interventions: verbalizes understanding, eager to participate  Coping skills were attempted to reduce the crisis:  Unable to assess     History of the Crisis   Bertin was transported to the ED yesterday related to change in behavior, paranoia, not sleeping and talking to himself.  He had been staying with his Aunt for the past 3 days and had not slept.  He was talking to himself and starting be aggressive towards himself which lead his aunt to calling 911.    Changes since last assessment  Bertin reported that he was able to sleep.  His symptoms have started to decrease.  He has been taking medications as prescribed in ED.      Significant Clinical History  Current Anxiety Symptoms:     Current Depression/Trauma:     Current Somatic Symptoms:     Current Psychosis/Thought Disturbance:  elated mood, hyperverbal, distractability  Current Eating Symptoms:     Chemical Use History:  Alcohol: None  Benzodiazepines: None  Opiates: None  Cocaine: None  Marijuana: None   Past diagnosis:  Bipolar Disorder  Family history:     Past treatment:  Case management, Psychiatric Medication Management, Inpatient Hospitalization, Civil Commitment  Details of most recent treatment:  Aunt reports that patient has been on an injectable medication but 2 months ago was switched to pill format at which time he stopped taking medication.  Other relevant history:          Collateral Information  Is there collateral information: Yes     Collateral information name, relationship, phone number:  Johan, [phone]    What happened today: Update:  Johan has not had an opportunity to talk to Bertin today.  She is concerned that if he discharges he will return to his unsafe apartment, throw away any medications that he is discharged with and not attend OP appointments.  She is going to call him to talk to him and we will touch base again after their conversation.     What is different about patient's functioning: People have been staying in his  house doing drugs and he can t get them out. He has been talking to himself and taking out of his head. He was crying for his mother who is .     Concern about alcohol/drug use:      What do you think the patient needs:      Has patient made comments about wanting to kill themselves/others: no    If d/c is recommended, can they take part in safety/aftercare planning:  no    Additional collateral information:  She believes he has been on recently Paliperidone 6mg     Risk Assessment  [facility] Suicide Severity Rating Scale Recent (completed since last contact):   Suicidal Ideation (Recent)  Q1 Wished to be Dead (Past Month): no  Q2 Suicidal Thoughts (Past Month): no     Environmental or Psychosocial Events: challenging interpersonal relationships, unstable housing, homelessness  Protective Factors: Protective Factors: strong bond to family unit, community support, or employment, help seeking, sense of belonging    Does the patient have thoughts of harming others? Feels Like Hurting Others: no  Previous Attempt to Hurt Others: no  Is the patient engaging in sexually inappropriate behavior?: no    Is the patient engaging in sexually inappropriate behavior?  no        Mental Status Exam   Affect: Labile  Appearance: Appropriate  Attention Span/Concentration: Attentive  Eye Contact: Engaged    Fund of Knowledge: Appropriate   Language /Speech Content: Fluent  Language /Speech Volume: Normal  Language /Speech Rate/Productions: Hyperverbal  Recent Memory: Variable  Remote Memory: Variable  Mood: Anxious  Orientation to Person: Yes   Orientation to Place: Yes  Orientation to Time of Day: Yes  Orientation to Date: Yes     Situation (Do they understand why they are here?): Yes  Psychomotor Behavior: Hyperactive  Thought Content: Clear  Thought Form: Goal Directed      Medication  Psychotropic medications:   Medication Orders - Psychiatric (From admission, onward)      Start     Dose/Rate Route Frequency Ordered Stop     01/07/24 0800  OLANZapine zydis (zyPREXA) ODT tab 10 mg         10 mg Oral 2 TIMES DAILY 01/06/24 2109 01/06/24 1800  paliperidone ER (INVEGA) 24 hr tablet 6 mg         6 mg Oral DAILY 01/06/24 1444               Current Care Team  Patient Care Team:  Clinic, [facility] as PCP - General    Diagnosis  Patient Active Problem List   Diagnosis Code    Affective bipolar disorder (H) F31.9    Suicidal ideation R45.851       Primary Problem This Admission  Active Hospital Problems    *Affective bipolar disorder (H)      Clinical Summary and Substantiation of Recommendations   Bertin will continue to be hospitalized for continued stabilzation of symptoms.    Imminent risk of harm: Suicidal Behavior  Severe psychiatric, behavioral or other comorbid conditions are appropriate for management at inpatient mental health as indicated by at least one of the following: Psychiatric Symptoms  Severe dysfunction in daily living is present as indicated by at least one of the following: Complete inability to maintain any appropriate aspect of personal responsibility in any adult roles  Situation and expectations are appropriate for inpatient care: Voluntary treatment at lower level of care is not feasible  Inpatient mental health services are necessary to meet patient needs and at least one of the following: Specific condition related to admission diagnosis is present and judged likely to deteriorate in absence of treatment at proposed level of care      Patient coping skills attempted to reduce the crisis:  Unable to assess    Disposition  Recommended disposition: Inpatient Mental Health        Reviewed case and recommendations with attending provider. Attending Name: Dr. Johnson       Attending concurs with disposition: yes       Patient and/or validated legal guardian concurs with disposition:   yes       Final disposition:  inpatient mental health    Legal status on admission: Voluntary/Patient has signed  consent for treatment    Assessment Details   Total duration spent on the patient case in minutes: 30 min     CPT code(s) utilized: 91730 - Psychotherapy for Crisis - 60 (30-74*) min    KENDALL AGUDELO, Psychotherapist  DEC - Triage & Transition Services

## 2024-01-07 NOTE — ED NOTES
RN ED Mental Health Handoff Note    72 hour hold    Does patient require 1:1? No    Hold and rights been given and documented for patient: Yes    Is the patient in BH scrubs? Yes, given sweater from belongings-has no strings    Has the patient been searched? Yes    Is the 15 minute observation tool up to date? Yes    Was patient issued a welcome folder? Yes    Room check completed this shift: Yes    PSS3 and [place] Assessment/Reassessment this shift:    PSS-3      Date and Time Over the past 2 weeks have you felt down, depressed, or hopeless? Over the past 2 weeks have you had thoughts of killing yourself? Have you ever attempted to kill yourself? When did this last happen? User   01/06/24 1035 yes no no -- AKR          C-SSRS ([place])      Date and Time Q1 Wished to be Dead (Past Month) Q2 Suicidal Thoughts (Past Month) Q3 Suicidal Thought Method Q4 Suicidal Intent without Specific Plan Q5 Suicide Intent with Specific Plan Q6 Suicide Behavior (Lifetime) Within the Past 3 Months? RETIRED: Level of Risk per Screen Screening Not Complete User   01/06/24 1212 no no -- -- -- -- -- -- -- YTM            Behavioral status of patient: Yellow. Pt boisterous and exhibiting manic behavior    Code 21 called this shift? No    Use of restraints/seclusion this shift? No    Most recent vital signs:  Temp: 98.1  F (36.7  C)   BP: (!) 136/96 Pulse: 94   Resp: 18 SpO2: 100 %        Medications:  Scheduled medication compliance? Yes    PRN Meds administered this shift? Yes    Medications   paliperidone ER (INVEGA) 24 hr tablet 6 mg (6 mg Oral Not Given 1/6/24 1718)   OLANZapine zydis (zyPREXA) ODT tab 10 mg (10 mg Oral $Given 1/7/24 3287)   OLANZapine zydis (zyPREXA) ODT tab 10 mg (10 mg Oral $Given 1/6/24 1100)   OLANZapine zydis (zyPREXA) ODT tab 10 mg (10 mg Oral $Given 1/6/24 2117)   benzocaine-menthol (CHLORASEPTIC) 6-10 MG lozenge 1 lozenge (1 lozenge Buccal $Given 1/7/24 0556)         ADLs    Meal Provided this shift?  Yes    Hygiene items provided? Yes    ADLs completed? Yes    Date of last shower: 1/6/24    Any significant events this shift? No    Any information that would be helpful in caring for this patient?      Family present/updated? No    Location of patient's belongings: DEC    Critical Care Minutes:  Does the patient need critical care minutes documented? Yes

## 2024-01-07 NOTE — ED NOTES
[facility] ED Mental Health Handoff Note:       Brief HPI:  This is a 28 year old male signed out to me by Dr. Johnson.  See initial ED Provider note for full details of the presentation. Interval history is pertinent for continuing to refuse Invega, but taking Zyprexa and having some clinical improvement..    Home meds reviewed and ordered/administered: Yes    Medically stable for inpatient mental health admission: Yes.    Evaluated by mental health: Yes. The recommendation is for inpatient mental health treatment. Bed search in process process protracted due to asymptomatic COVID positive.    Hold Status:  Active Orders   Legal    Emergency Hospitalization Hold (72 Hr Hold)     Frequency: Effective Now     Start Date/Time: 01/07/24 0000      Number of Occurrences: Until Specified    Health Officer Authority to Detain (PARIS)     Frequency: Effective Now     Start Date/Time: 01/06/24 1035      Number of Occurrences: Until Specified     Order Comments: This patient presented with circumstances that have led me to be reasonably suspicious that the patient is experiencing psychosis. The patient's judgment to this situation appears to be impaired. Given the circumstances in which the patient presented, it is likely that the patient is at significant risk of harming self or others if this situation is not investigated further. I am highly concerned that the patient is mentally ill and currently cannot safely care for oneself. This represents endangerment to the patient's well-being and safety, and I am placing a Health Officer Authority hold upon the patient at this time.       Exam:   Patient Vitals for the past 24 hrs:   BP Temp Pulse Resp SpO2   01/07/24 1000 (!) 139/95 97.7  F (36.5  C) 100 18 100 %       Resting comfortably, dribbling basketball in the room.    ED Course:    Medications   paliperidone ER (INVEGA) 24 hr tablet 6 mg (6 mg Oral Not Given 1/7/24 1009)   OLANZapine zydis (zyPREXA) ODT tab 10 mg  (10 mg Oral $Given 1/7/24 0437)   risperiDONE (risperDAL) tablet 1 mg (has no administration in time range)   OLANZapine zydis (zyPREXA) ODT tab 10 mg (10 mg Oral $Given 1/6/24 1100)   OLANZapine zydis (zyPREXA) ODT tab 10 mg (10 mg Oral $Given 1/6/24 2117)   benzocaine-menthol (CHLORASEPTIC) 6-10 MG lozenge 1 lozenge (1 lozenge Buccal $Given 1/7/24 0352)   risperiDONE (risperDAL) tablet 1 mg (1 mg Oral $Given 1/7/24 1640)          Of note, the patient continues to refuse Invega, but does tolerate and accepts ongoing Zyprexa 10 mg twice daily and risperidone.  We will initiate risperidone 2 mg nightly as an equivalent dose to his Invega while we await disposition and psychiatry consult for the patient.  I anticipate his clinical improvement may make him safe to be discharged in the next 24 hours.    There were no significant events during my shift.    Patient was signed out to the oncoming provider, Dr. Ponce.      Impression:    ICD-10-CM    1. Schizoaffective disorder, bipolar type (H)  F25.0       2. Noncompliance with medication regimen  Z91.148           Plan:    Awaiting inpatient mental health admission/transfer.      RESULTS:   Results for orders placed or performed during the hospital encounter of 01/06/24 (from the past 24 hour(s))   [facility] ([facility]) Assessment Consult Order:     Status: None ()    Collection Time: 01/07/24  7:35 AM    Silvia Roper LPCC, [facility]     1/7/2024  3:52 PM  DEC Consult Order acknowledged. EC reassessment completed today   by Sarah Blake [facility].     SILVIA LANE M.Ed., [facility], [facility]  Licensed Mental Health Professional  Triage and Transition Services - [facility] [phone]                   MD Kelin Pinto John Eric, MD  01/07/24 7192

## 2024-01-08 ENCOUNTER — TELEPHONE (OUTPATIENT)
Dept: BEHAVIORAL HEALTH | Facility: CLINIC | Age: 29
End: 2024-01-08
Payer: MEDICARE

## 2024-01-08 PROCEDURE — 99284 EMERGENCY DEPT VISIT MOD MDM: CPT

## 2024-01-08 PROCEDURE — 250N000013 HC RX MED GY IP 250 OP 250 PS 637: Performed by: EMERGENCY MEDICINE

## 2024-01-08 RX ORDER — RISPERIDONE 2 MG/1
2 TABLET ORAL AT BEDTIME
Qty: 30 TABLET | Refills: 0 | Status: SHIPPED | OUTPATIENT
Start: 2024-01-08

## 2024-01-08 RX ORDER — OLANZAPINE 10 MG/1
10 TABLET, ORALLY DISINTEGRATING ORAL 2 TIMES DAILY
Qty: 60 TABLET | Refills: 0 | Status: SHIPPED | OUTPATIENT
Start: 2024-01-08

## 2024-01-08 RX ADMIN — OLANZAPINE 10 MG: 5 TABLET, ORALLY DISINTEGRATING ORAL at 07:48

## 2024-01-08 RX ADMIN — RISPERIDONE 2 MG: 1 TABLET ORAL at 21:00

## 2024-01-08 RX ADMIN — OLANZAPINE 10 MG: 5 TABLET, ORALLY DISINTEGRATING ORAL at 21:00

## 2024-01-08 ASSESSMENT — COLUMBIA-SUICIDE SEVERITY RATING SCALE - C-SSRS
TOTAL  NUMBER OF INTERRUPTED ATTEMPTS SINCE LAST CONTACT: NO
2. HAVE YOU ACTUALLY HAD ANY THOUGHTS OF KILLING YOURSELF?: NO
TOTAL  NUMBER OF ABORTED OR SELF INTERRUPTED ATTEMPTS SINCE LAST CONTACT: NO
1. SINCE LAST CONTACT, HAVE YOU WISHED YOU WERE DEAD OR WISHED YOU COULD GO TO SLEEP AND NOT WAKE UP?: NO
6. HAVE YOU EVER DONE ANYTHING, STARTED TO DO ANYTHING, OR PREPARED TO DO ANYTHING TO END YOUR LIFE?: NO
ATTEMPT SINCE LAST CONTACT: NO
SUICIDE, SINCE LAST CONTACT: NO

## 2024-01-08 ASSESSMENT — ACTIVITIES OF DAILY LIVING (ADL)
ADLS_ACUITY_SCORE: 35

## 2024-01-08 NOTE — ED NOTES
RN ED Mental Health Handoff Note    Voluntary    Does patient require 1:1? No    Hold and rights been given and documented for patient: Yes    Is the patient in  scrubs? Yes    Has the patient been searched? Yes    Is the 15 minute observation tool up to date? Yes    Was patient issued a welcome folder? No -    Room check completed this shift: Yes    PSS3 and [place] Assessment/Reassessment this shift:    PSS-3      Date and Time Over the past 2 weeks have you felt down, depressed, or hopeless? Over the past 2 weeks have you had thoughts of killing yourself? Have you ever attempted to kill yourself? When did this last happen? User   01/06/24 1035 yes no no -- AKR          C-SSRS ([place])      Date and Time Q1 Wished to be Dead (Past Month) Q2 Suicidal Thoughts (Past Month) Q3 Suicidal Thought Method Q4 Suicidal Intent without Specific Plan Q5 Suicide Intent with Specific Plan Q6 Suicide Behavior (Lifetime) Within the Past 3 Months? RETIRED: Level of Risk per Screen Screening Not Complete User   01/07/24 0949 no no -- -- -- -- -- -- -- PJB   01/06/24 1212 no no -- -- -- -- -- -- -- YTM            Behavioral status of patient: Green    Code 21 called this shift? No    Use of restraints/seclusion this shift? No    Most recent vital signs:                       Medications:  Scheduled medication compliance? Yes    PRN Meds administered this shift? No    Medications   OLANZapine zydis (zyPREXA) ODT tab 10 mg (10 mg Oral $Given 1/8/24 0748)   risperiDONE (risperDAL) tablet 2 mg (2 mg Oral Not Given 1/7/24 2136)   OLANZapine zydis (zyPREXA) ODT tab 10 mg (10 mg Oral $Given 1/6/24 1100)   OLANZapine zydis (zyPREXA) ODT tab 10 mg (10 mg Oral $Given 1/6/24 2117)   benzocaine-menthol (CHLORASEPTIC) 6-10 MG lozenge 1 lozenge (1 lozenge Buccal $Given 1/7/24 0352)   risperiDONE (risperDAL) tablet 1 mg (1 mg Oral $Given 1/7/24 1640)         ADLs    Meal Provided this shift? Yes    Hygiene items provided? Yes    ADLs  completed? Yes    Date of last shower: Unknown. Requesting shower. No longer needs security for shower.    Any significant events this shift? No    Any information that would be helpful in caring for this patient?  COVID +. Will discharge home with aunt and safety plan tomorrow.    Family present/updated? Yes    Location of patient's belongings: DEC office    Critical Care Minutes:  Does the patient need critical care minutes documented? NO

## 2024-01-08 NOTE — TELEPHONE ENCOUNTER
R:    [12:32 PM] Silvia Monet    [MRN], CB, adult [place], please remove from IP MH worklist    Intake has removed pt from active placement [facility]. Intake to no longer follow for IP MH admission.

## 2024-01-08 NOTE — TELEPHONE ENCOUNTER
R: [place]  Access Inpatient Bed Call Log 1/8/2024 8:02:38 AM     Intake has called facilities that have not updated their bed status within the last 12 hours.     ADULTS:   *[facility] is posting 0 beds.               [facility] is posting 0 beds. [phone]     [facility] is posting 0 beds. [phone]               [facility] is posting 0 beds. [phone] 8:13a Per Saba, at capacity. Not going to have any availability today.    [facility] is posting 0 beds. [phone]             [facility] is posting 0 beds. [phone]    [facility] (These are Young Adult beds, 18-26) is posting 3 beds. Negative COVID test required, no recent or significant aggression, violence, or sexual assault. [phone] Pt is not age appropriate.  [facility] is posting 0 beds. [phone]             [facility] is posting 0 beds. [phone]      [facility] is posting 0 beds. [phone]             *[facility] is posting 2 beds. Mixed unit 12+. Low acuity only. [phone] Pt does not qualify due to being COVID+.  [facility] is positing 1 beds. No aggression.  [phone]  Pt does not qualify due to being COVID+.    [facility] is posting 0 beds. [phone] Pt does not qualify due to being COVID+.  [facility] is posting 0 beds. Low acuity only. [phone]  Pt does not qualify due to being COVID+.  [facility] is posting 0 beds. Low acuity. No current aggression. [phone] does not qualify due to being COVID+.    [facility] is posting 3 beds. Low acuity. [phone]  Pt does not qualify due to being COVID+.  [facility] is posting 1 beds. 72 HH preferred. Low acuity. [phone] 8:12a Left VM.  Pt does not qualify due to being COVID+.  [facility] is posting 5 beds. Low acuity. [phone]  Pt does not qualify due to being COVID+.    [facility] is posting 5 beds. Vol  only, No Hx of aggression, violence, or assault. No sexual offenders. No 72 hr holds.     Pt does not qualify due to being COVID+.  [facility] is posting 7 beds. (Must have the cognitive ability to do programming. No aggressive or violent behavior or recent HX in the last 2 yrs. MH must be primary.) [phone]  Pt does not qualify due to being COVID+.  [facility] is posting 0 beds. Low acuity only. Violence and aggression capped. [phone]      [facility] is posting 0 beds. Low acuity, Neg Covid. [phone] 8:10a Per Michelle, at capacity. Awaiting discharges.  Pt does not qualify due to being COVID+.  [facility] is posting 6 beds. Covid neg. Vol only. Combined adolescent and adult unit. No aggressive or violent behavior. No registered sex offenders. [phone]  Pt does not qualify due to being COVID+.  [facility] posting 0 beds. Negative covid.   Pt does not qualify due to being COVID+.     [facility] is posting 2 beds. [phone] no wounds, lines, drains, C-paps, tubes and must be able to care for themselves; no heavy hx of aggression. Pt also needs a confirmed ride from facility upon d/c. 8:103 beds available.  Pt does not qualify due to being COVID+.   [facility] is posting 20 beds. Call for details. [phone] OUT OF STATE 8:08a No answer.   [facility] is posting 5 beds. Mixed unit.  [phone] 8:07a Per deborah Queen CB.  Pt does not qualify due to being COVID+.     Pt remains on work list pending appropriate bed availability.

## 2024-01-08 NOTE — ED NOTES
Pt taken upstairs by tech to shower at 1545. Provided with shower and hygiene supplies and a change of clothes.

## 2024-01-08 NOTE — CONSULTS
"      Initial Psychiatric Consult   Consult date: January 8, 2024         Reason for Consult, requesting source:    Schizophrenia;     Requesting source: Edenilson Wakefield    Labs and imaging reviewed. Discussed with RNScott and attending provider Dr. Wakefield.    DEC Assessment from 1/6/24 reviewed.         HPI:   Per initial DEC assessment on 1/6/24 by Olga Mackay:  Bertin Medina presents to the ED via EMS. Patient is presenting to the ED for the following concerns: Paranoia, Significant behavioral change.   Factors that make the mental health crisis life threatening or complex are:  Patient appears to be experiencing psychotic symptoms in the form of delusional thoughts and papa.  Patient's aunt called 911 as he has been staying with her for the past 3 days but has not been sleeping and has been talking irrationally to himself.  During assessment patient was hyperverbal and had difficulty staying on topic when asked questions directly.  Patient does not appear to be a positive historian regarding his current situation but has some insight that he has not been doing well.  During conversation patient referred to assess her as mom repeatedly and his mother has been dead since he was 3 years old.  Patient denies concerns regarding suicidal ideation and homicidal ideation.  Patient denies all mental health symptoms but reports wanting to be on the right medication so that he can go back to school and go back to work appropriately.     Today, Bertin was playing with a basketball when I went to meet with him, he was agreeable to meet with me. He tells me that he feels better since coming to the ED and that prior to arriving he was \"messed up.\" He tells me that he has schizophrenia and that he at times talks to himself. He had not been taking medications because his insurance no longer covered his Invega injection. He was switched to oral paliperidone medication but this lead to sexual side effects so he stopped " taking it. He has been off of medications for at least 2 months. He tells me that prior to coming in he had difficulty sleeping, racing thoughts, and anxiety. He tells me that he has had others couch surfing at his house and that this has not been helpful as they're doing drugs. He tells me that he uses marijuana but is trying to cut back. He found marjuana helpful for sleep and anxiety when he stopped his medications. Since coming to the hospital he feels as though zyprexa and risperdal have been helpful and he wants to continue these. He would like to be scheduled with a new psychiatric medication provider and a therapist. Discussed following up with outpatient psych med provider for ongoing medication management and ideally progress towards antipsychotic monotherapy, if stable, to minimize potential side effects. He is agreeable to this. He denies SI and denies AH/VH. He presented as calm and organized during our assessment. Discussed removing him from the IP admission list given increased stability and further stabilization at outpatient levels of care. Given that he is COVID +, placement is challenging. He would ideally like to discharge and live with Aunt if she's willing yet he is agreeable to remaining under observation until safe discharge plan has been arranged.         Past Psychiatric History:   Record review indicates:  history of schizoaffective disorder, bipolar type, ADHD, OCD and oppositional defiant disorder     Record review indicates previous IP admissions:  [facility] 8/20-8/27/19  [facility] 8/11/19-8/13/19  Hospitalized twice in [place]- when in high school (2012) - suicidal ideation         Substance Use and History:   Reports previous cocaine and alcohol use. Ongoing cannabis use.         Past Medical History:   PAST MEDICAL HISTORY:   Past Medical History:   Diagnosis Date    Bipolar 1 disorder (H)     Depression     Schizo affective schizophrenia (H)        PAST SURGICAL HISTORY: No past surgical  history on file.          Family History:   FAMILY HISTORY: No family history on file.        Social History:   SOCIAL HISTORY:   Social History     Tobacco Use    Smoking status: Every Day     Packs/day: .25     Types: Cigarettes    Smokeless tobacco: Never   Substance Use Topics    Alcohol use: Not Currently     Identifies aunt as primary support, aunt adopted him following his mother's death when he was 2.5 years old. Was living in independent apartment however this lease has since ended. He reports that he just started working at [employer].          Physical ROS:   The 10 point Review of Systems is negative other than noted in the HPI or here.           Medications:      OLANZapine zydis  10 mg Oral BID    paliperidone  6 mg Oral Daily    risperiDONE  2 mg Oral At Bedtime              Allergies:   No Known Allergies       Labs:     Recent Results (from the past 48 hour(s))   Asymptomatic COVID-19 Virus (Coronavirus) by PCR Nasopharyngeal    Collection Time: 01/06/24 11:57 AM    Specimen: Nasopharyngeal; Swab   Result Value Ref Range    SARS CoV2 PCR Positive (A) Negative   Comprehensive metabolic panel    Collection Time: 01/06/24 11:58 AM   Result Value Ref Range    Sodium 140 135 - 145 mmol/L    Potassium 4.1 3.4 - 5.3 mmol/L    Carbon Dioxide (CO2) 27 22 - 29 mmol/L    Anion Gap 11 7 - 15 mmol/L    Urea Nitrogen 22.6 (H) 6.0 - 20.0 mg/dL    Creatinine 1.24 (H) 0.67 - 1.17 mg/dL    GFR Estimate 82 >60 mL/min/1.73m2    Calcium 9.2 8.6 - 10.0 mg/dL    Chloride 102 98 - 107 mmol/L    Glucose 123 (H) 70 - 99 mg/dL    Alkaline Phosphatase 64 40 - 150 U/L    AST 20 0 - 45 U/L    ALT 14 0 - 70 U/L    Protein Total 7.3 6.4 - 8.3 g/dL    Albumin 4.4 3.5 - 5.2 g/dL    Bilirubin Total 0.3 <=1.2 mg/dL   Ethyl Alcohol Level    Collection Time: 01/06/24 11:58 AM   Result Value Ref Range    Alcohol ethyl <0.01 <=0.01 g/dL   CBC with platelets and differential    Collection Time: 01/06/24 11:58 AM   Result Value Ref Range    WBC  Count 6.9 4.0 - 11.0 10e3/uL    RBC Count 5.29 4.40 - 5.90 10e6/uL    Hemoglobin 12.8 (L) 13.3 - 17.7 g/dL    Hematocrit 38.9 (L) 40.0 - 53.0 %    MCV 74 (L) 78 - 100 fL    MCH 24.2 (L) 26.5 - 33.0 pg    MCHC 32.9 31.5 - 36.5 g/dL    RDW 15.4 (H) 10.0 - 15.0 %    Platelet Count 297 150 - 450 10e3/uL    % Neutrophils 73 %    % Lymphocytes 20 %    % Monocytes 7 %    % Eosinophils 0 %    % Basophils 0 %    % Immature Granulocytes 0 %    NRBCs per 100 WBC 0 <1 /100    Absolute Neutrophils 4.9 1.6 - 8.3 10e3/uL    Absolute Lymphocytes 1.4 0.8 - 5.3 10e3/uL    Absolute Monocytes 0.5 0.0 - 1.3 10e3/uL    Absolute Eosinophils 0.0 0.0 - 0.7 10e3/uL    Absolute Basophils 0.0 0.0 - 0.2 10e3/uL    Absolute Immature Granulocytes 0.0 <=0.4 10e3/uL    Absolute NRBCs 0.0 10e3/uL   Urine Drug Screen Panel    Collection Time: 01/06/24  4:42 PM   Result Value Ref Range    Amphetamines Urine Screen Negative Screen Negative    Barbituates Urine Screen Negative Screen Negative    Benzodiazepine Urine Screen Negative Screen Negative    Cannabinoids Urine Screen Positive (A) Screen Negative    Cocaine Urine Screen Negative Screen Negative    Fentanyl Qual Urine Screen Negative Screen Negative    Opiates Urine Screen Negative Screen Negative    PCP Urine Screen Negative Screen Negative          Physical and Psychiatric Examination:     BP (!) 139/95   Pulse 100   Temp 97.7  F (36.5  C)   Resp 18   Wt 61 kg (134 lb 6.4 oz)   SpO2 100%   BMI 19.28 kg/m    Weight is 134 lbs 6.4 oz  Body mass index is 19.28 kg/m .    Physical Exam:  I have reviewed the physical exam as documented by by the medical team and agree with findings and assessment and have no additional findings to add at this time.    Mental Status Exam:    Appearance: awake, alert, adequately groomed, appeared as age stated, and casually dressed  Attitude:  cooperative  Eye Contact:  good  Mood:  good  Affect:  mood congruent  Speech:  clear, coherent and normal prosody,  slightly pressured when discussing basketball   Psychomotor Behavior:  no evidence of tardive dyskinesia, dystonia, or tics and intact station, gait and muscle tone  Thought Process:  linear  Associations:  no loose associations  Thought Content:  no evidence of suicidal ideation or homicidal ideation and no evidence of psychotic thought  Insight:  fair  Judgement:  fair  Oriented to:  time, person, and place  Attention Span and Concentration:  fair  Recent and Remote Memory:  fair               DSM-5 Diagnosis:   295.70  (F25) Schizoaffective Disorder Bipolar Type    ADHD by history          Assessment:   Bertin Medina is a 28 year old  male with a psychiatry history of schizoaffective disorder, bipolar type, ADHD, OCD, ODD who presented to the ED on 1/6/24 with increasing mood lability, lack of sleep x 3 days, and disorganization. Of note, patient has not been taking his medications noting that his insurance stopped covering his Invega thus he has been off of medications for 2 months. He also endorses daily cannabis use which could be further exacerbating mood dysregulation. He has been compliant with oral zyprexa and risperidone in the emergency department and appears to be stabilizing as evidenced by increased organization of thoughts and sleep. He would like to continue taking these medications noting that he has tolerated them well. Discussed continuing to follow-up with outpatient psych med provider for ongoing medication optimization and ideally working toward antipsychotic monotherapy, if stable, to minimize metabolic side effects.  He has been verbally redirectable and cooperative with staff. At time of assessment he denies SI and denies AH/VH. He's fully oriented. At time of assessment, he no longer meets criteria for involuntary hold thus this will be discontinued. He is in agreement to remain under observation until a safe discharge can be arranged. [facility] completed safety planning with  patient and aunt and he plans to discharge tomorrow to stay with aunt. Referrals were provided for psych med management and therapy. I consulted with attending medical provider, Dr. Wakefield, and I ordered psychiatric medications at patient's preferred pharmacy for pick-up following discharge tomorrow.           Summary of Recommendations:   Continue zyprexa 10mg twice daily targeting mood stabilization and psychosis  Continue risperidone 2mg at bedtime  targeting mood stabilization and psychosis  Referrals provided for outpatient med management and therapy  Discussed continuing to follow-up with outpatient psych med provider for ongoing medication optimization and ideally working toward antipsychotic monotherapy, if stable, to minimize metabolic side effects.   At time of assessment patient appears to have improved and there were no acute safety concerns noted. He no longer meets criteria for involuntary hold thus this will be discontinued. He is in agreement to remain under observation until a safe discharge can be arranged.   Patient is psychiatrically cleared for discharge and plans to discharge tomorrow, 1/9/24, and stay with aunt. Given that patient is COVID positive, placement options are limited and aunt is unable to take patient today. In order to promote continued stabilization and safe discharge, patient will remain under observation with discharge arranged for tomorrow to Aunt. Discussed with [facility] staff, Silvia, and attending provider, Dr. Wakefield. I have sent a 30 day supply of medication to patient's preferred pharmacy for discharge.       RIAZ Smith [facility]    Consult/Liaison Psychiatry   [facility]    Contact information available via [facility] Paging/Directory  If I am not available, then [facility] CL line ([phone]) should know who is covering our consult service.

## 2024-01-08 NOTE — ED NOTES
RN ED Mental Health Handoff Note    72 hour hold    Does patient require 1:1? No    Hold and rights been given and documented for patient: Yes    Is the patient in BH scrubs? Yes    Has the patient been searched? Yes    Is the 15 minute observation tool up to date? Yes    Was patient issued a welcome folder? Yes    Room check completed this shift: Yes    PSS3 and Woodson Assessment/Reassessment this shift:    PSS-3      Date and Time Over the past 2 weeks have you felt down, depressed, or hopeless? Over the past 2 weeks have you had thoughts of killing yourself? Have you ever attempted to kill yourself? When did this last happen? User   01/06/24 1035 yes no no -- AKR          C-SSRS (Woodson)      Date and Time Q1 Wished to be Dead (Past Month) Q2 Suicidal Thoughts (Past Month) Q3 Suicidal Thought Method Q4 Suicidal Intent without Specific Plan Q5 Suicide Intent with Specific Plan Q6 Suicide Behavior (Lifetime) Within the Past 3 Months? RETIRED: Level of Risk per Screen Screening Not Complete User   01/07/24 0949 no no -- -- -- -- -- -- -- PJB   01/06/24 1212 no no -- -- -- -- -- -- -- YTM            Behavioral status of patient: Green    Code 21 called this shift? No    Use of restraints/seclusion this shift? No    Most recent vital signs:  Temp: 97.7  F (36.5  C)   BP: (!) 139/95 Pulse: 100   Resp: 18 SpO2: 100 % O2 Device: None (Room air)      Medications:  Scheduled medication compliance? No    PRN Meds administered this shift? No    Medications   paliperidone ER (INVEGA) 24 hr tablet 6 mg (6 mg Oral Not Given 1/7/24 1009)   OLANZapine zydis (zyPREXA) ODT tab 10 mg (10 mg Oral Not Given 1/7/24 2136)   risperiDONE (risperDAL) tablet 2 mg (2 mg Oral Not Given 1/7/24 2136)   OLANZapine zydis (zyPREXA) ODT tab 10 mg (10 mg Oral $Given 1/6/24 1100)   OLANZapine zydis (zyPREXA) ODT tab 10 mg (10 mg Oral $Given 1/6/24 2117)   benzocaine-menthol (CHLORASEPTIC) 6-10 MG lozenge 1 lozenge (1 lozenge Buccal $Given 1/7/24  0352)   risperiDONE (risperDAL) tablet 1 mg (1 mg Oral $Given 1/7/24 1640)         ADLs    Meal Provided this shift? Yes    Hygiene items provided? Yes    ADLs completed? Yes    Date of last shower: Unknown, requesting shower     Any significant events this shift? No    Any information that would be helpful in caring for this patient?  None    Family present/updated? No    Location of patient's belongings: DEC    Critical Care Minutes:  Does the patient need critical care minutes documented? No

## 2024-01-08 NOTE — ED PROVIDER NOTES
Patient care for this patient after signout from my colleague.  During my shift, I received a call from Sara Madden NP, who informing that the patient is safe for discharge at this time.  The patient has improved clinically after starting medication.  She recommends that the patient continue Zyprexa and Risperdal.  She puts in the prescription orders for these.  The patient feels comfortable discharging home at this time.  Unfortunate the patient is COVID-positive and is currently homeless.  Due to the COVID diagnosis, he is not able to go to home shelter or crisis bed.  The patient is on is able to pick the patient up to go to her home, but unfortunately she is currently at [facility] with her child who is on a ventilator.  She is not able to pick him up today.  It may not be until tomorrow sometime that she is able to pick him up.  The patient will board in the emergency department until she is able to get him.  I spoke to the DEC , she states the patient was able to be safety planned.  I am signing out care of this patient to my oncoming colleague while awaiting the patient's aunt to arrive for the patient's discharge.    Edenilson Wakefield MD on 1/8/2024 at 3:05 PM           Edenilson Wakefield MD  01/08/24 4027

## 2024-01-08 NOTE — DISCHARGE INSTRUCTIONS
Follow Up Appointments:     Date: Wednesday, 1/10/2024  Time: 10:00 am - 11:00 am  Provider: Jeannette Mcdonald  MSN  CNP,PMHNP  Location: [address], [place] [address]  Phone: [phone]  Type: Telepsychiatry    Patient Instructions:  I do in person appointments only on Thursdays at this location from 10am until 4pm with some exceptions. I do virtual visits the rest of the weekdays from 9am until 5pm. Please call  to clarify anything you need to know prior to your appointment. Anything that needs to be faxed e.g discharge paperwork should be done prior to the appointment. Our fax number is [phone]. The cover sheet should indicate my name  Jeannette Mcdonald.       Date: Tuesday, 1/16/2024  Time: 9:00 am - 10:00 am  Provider: Narayan Soriano MA  [facility]  Location: [facility], [id number] [place] [address], [facility] [address] [place] [address]  Phone: [phone]  Type: Therapy - Initial (In-Person)    Patient Instructions:  If the patient is under the age of 12, we ask that only the parent or legal guardian attend the first session. Please arrive 10 minutes prior to your first appointment, and bring your insurance card and photo identification. For Teletherapy, a zoom link will be sent via text and email. Please check in to your patient portal to complete important information prior to the appt. As a reminder, you must be in [place] in order to receive telehealth services with us. Visit us at [employer]

## 2024-01-08 NOTE — ED PROVIDER NOTES
Sign-out Note    Received this patient in sign-out from Dr. Wakefield at 3:34 PM.  Please refer to earlier documentation detailing presenting complaints, evaluation, and ED course.  In brief, patient is being seen in the ER for psychiatric evaluation.  History of schizophrenia.  Has been seen by psychiatry and patient has improved after starting zyprexa and risperdal.  Patient is found to be COVID.  Has been felt stable for discharge though currently homeless. Aunt able to care for him, though currently in another hospital with a patient and unable to  today.    Recommendations from previous provider: Board overnight, anticipate discharge home.    ED course under my care:  None    Disposition: Sign-out to overnight provider         Andreas Terry MD  01/08/24 [id number]

## 2024-01-08 NOTE — ED NOTES
"Patient up, sitting at edge of bed talking to himself. RN went to talk to patient, patient stated \"Man I'm just trying to get some sleep. People are slamming doors and laughing out there\". RN stated that staff will continue to keep as quiet a space as possible but some noise can't be avoided due to the nature of being in an emergency department. Patient verbalized understanding of same.   "

## 2024-01-08 NOTE — PROGRESS NOTES
"Triage and Transition Services Extended Care Reassessment     Patient: Bertin goes by \"Bertin,\" uses he/him pronouns  Date of Service: January 8, 2024  Site of Service: [facility] EMERGENCY DEPT                             ED06  Patient was seen yes  Mode of Assessment: In person     Reason for Reassessment: other (see comment) (Reassessment for possible discharge)    History of Patient's Original Emergency Room Encounter: Bertin was transported to the ED yesterday related to change in behavior, paranoia, not sleeping and talking to himself.  He had been staying with his Aunt for the past 3 days and had not slept.  He was talking to himself and starting be aggressive towards himself which lead his aunt to calling 911.    Current Patient Presentation: Patient lying on gurney under blanket, quietly resting and listening to music. Pt responded to verbal cue of his name immediately and greeted this writer formally and politely.    Presentation Summary: Writer introduced self, explained role, and asked for permission to visit. Pt thanked writer profusely for care pt has received in ED and stated repeatedly how grateful he is for ED staff. Pt reported that he is feeling much better since since being able to rest in ED as he was \"dealing with some things.\" Pt denied current SI/HI, plan, and intent. Pt verbalized insight into his mental health condition as well as interest in continuing to manage it. Pt did not appear to be  responding to internal stimuli during interview. Pt has been calm and respectful in ED last 24 hours. Pt declined referral to [facility] for crisis stabilization services, but he did agree to telehealth appointments for psych med mgmnt and therapy.   Pt responded coherently to questions when prompted. Pt was able to meaningfully engage in safety and aftercare planning. Pt verbally confirmed that he wants to discharge to his Aunt Johan's home in [place], and that in speaking with her " "on the phone today that he understands that he cannot go there until tomorrow. Pt verbalized willingness to voluntarily remain in ED overnight because \"that's best for me to go to my aunt's.\" Pt reported that he plans to take tomorrow to get a haircut and rest and then return to work on Wednesday. Pt reported that he had already spoken with his manager and that he is welcome to return to work.     Changes Observed Since Initial Assessment: decrease in presenting symptoms, provider request    Therapeutic Interventions Provided: Engaged in safety planning, Identified and practiced coping skills., Explored motivation for treatment engagement.    Current Symptoms: anxious   anxious auditory hallucinations  (no eating symptoms noted)    Mental Status Exam   Affect: Appropriate  Appearance: Appropriate  Attention Span/Concentration: Attentive  Eye Contact: Engaged    Fund of Knowledge: Appropriate   Language /Speech Content: Fluent  Language /Speech Volume: Normal  Language /Speech Rate/Productions: Hyperverbal  Recent Memory: Intact  Remote Memory: Intact  Mood: Anxious  Orientation to Person: Yes   Orientation to Place: Yes  Orientation to Time of Day: Yes  Orientation to Date: Yes     Situation (Do they understand why they are here?): Yes  Psychomotor Behavior: Other (please comment) (WDL)  Thought Content: Hallucinations  Thought Form: Goal Directed    Treatment Objective(s) Addressed: processing feelings, safety planning, exploring obstacles to safety in the community, identifying and practicing coping strategies, assessing safety    Patient Response to Interventions: eager to participate, acceptance expressed, verbalizes understanding    Progress Towards Goals:  Patient Reports Symptoms Are: improving  Patient Progress Toward Goals: is making progress  Comment: Pt is able to adequately engage in safety planning at time of interview.  Next Step to Work Toward Discharge: collaboration with OP " team/family/friends  Symptom Stabilization Comment: Bertin's symptoms have started to improve since ED admission.  Bertin has been taking medication as prescribed while in the ED and was able to get some sleep last night.  Today has been hyperactive but does not appear to be responding to internal stimuli.  Collaboration Comment: Bertin's Aunt Johan is working on finding a different living environment for him, temporarily until they can find a new apartment for him.  She fears for his safety if he is to return to his previous place due to the people that have been staying on his couch.    Case Management: Case Management Included: collaborating with patient's support system  Details on Collaborating with Patient's Support System: 1308 Spoke with pt's relative Johan for additional collateral info and to discuss aftercare and discharge planning  Summary of Interaction: 1431 Attempted to callback to Johan to confirm POC, however VM box full.  allowed text to be sent with callback #[phone]    C-SSRS Since Last Contact:   1. Wish to be Dead (Since Last Contact): No  2. Non-Specific Active Suicidal Thoughts (Since Last Contact): No     Actual Attempt (Since Last Contact): No  Has subject engaged in non-suicidal self-injurious behavior? (Since Last Contact): No  Interrupted Attempts (Since Last Contact): No  Aborted or Self-Interrupted Attempt (Since Last Contact): No  Preparatory Acts or Behavior (Since Last Contact): No  Suicide (Since Last Contact): No     Calculated C-SSRS Risk Score (Since Last Contact): No Risk Indicated    Plan: Final Disposition / Recommended Care Path: discharge  Plan for Care reviewed with assigned Medical Provider: yes  Plan for Care Team Review: provider, RN, other (see comment) (RIAZ Smith, CNP, consulting psychiatry provider)  Comments: Edenilson Wakefield MD, and Scott ORELLANA RN  Patient and/or validated legal guardian concurs: yes  Clinical Substantiation: After therapeutic  assessment, intervention and aftercare planning by ED care team and [facility] and in consultation with attending provider and consulting psychiatric provider, the patient's circumstances and mental state were appropriate for outpatient management. It is the recommendation of this clinician that pt discharge with outpatient mental health supports. At this time the pt is not presenting as an acute risk to self or others due to the following factors: Denies SI/HI, plan, and intent; calm and cooperative in ED last 24 hrs; improvement in clinical symptoms with resumption of medicaitons in ED; pt verbalizes willingness for outpatient medication adherence and follow up with providers; psych med mgment and therapy appmnts scheduled; pt reports eagerness to return to work. Pt able to meaningfully engage in safety and aftercare planning at time of interview.   A safe discharge plan is available for pt first thing Tuesday morning when his closest support person will return home and will allow him to stay with her. An alternative safe discharge plan is not available to pt at this time due to pt being homeless and recently testing COVID+. Consulting psychiatry alphonse sent a 30 day supply of medication to patient's preferred pharmacy for discharge.    Legal Status: Legal Status at Admission: Voluntary/Patient has signed consent for treatment  72 Hour Hold - Date/Time Initiated: 2359 Sat 1/6  72 Hour Hold - Date/Time Ends: 2359 Wed 1/10    Session Status: Time session started: 1349  Time session ended: 1401  Session Duration (minutes): 12 minutes  Session Number: 0  Anticipated number of sessions or this episode of care: 1  Date of most recent diagnostic assessment:  (none found in Epic)    Session Start Time: 1349  Session Stop Time: 1401  CPT codes: Non-Billable  Time Spent: 12 minutes      CPT code(s) utilized: Non-Billable    Diagnosis:   Patient Active Problem List   Diagnosis Code    Affective bipolar disorder (H) F31.9     Suicidal ideation R45.851    ADD (attention deficit disorder) F98.8    Oppositional defiant disorder F91.3    JOSE (generalized anxiety disorder) F41.1    OCD (obsessive compulsive disorder) F42.9    Schizophrenia (H) F20.9       Primary Problem This Admission: Active Hospital Problems    *Affective bipolar disorder (H)        [address], [facility]   Licensed Mental Health Professional (LMHP), [facility]  [phone]

## 2024-01-08 NOTE — TELEPHONE ENCOUNTER
R: [place]  Access Inpatient Bed Call Log 1/7/2024 3:54 PM    Intake has called facilities that have not updated the bed status within the last 12 hours.                             [facility] is at capacity           [facility] is posting 0 beds. [phone]   [facility] is posting 0 beds. Negative covid required             [facility] is posting 0 beds. Neg covid. No high school or [place]-psych. [phone]   [facility] is posting 0 beds. [phone]  [facility] is posting 0 beds. [phone]   [facility] is posting 3 beds. Negative covid. [phone]   [facility] is posting 0 beds          [facility] ([facility]) is posting 0 beds [phone]    [facility] is posting 2 beds. LOW acuity ONLY. Mixed unit 12+. Negative covid- [phone]  [facility] has 2 bed posted. No aggression. Negative Covid. Low acuity   [facility] is posting 0 beds. Negative covid. [phone]   [facility] ([place]) is posting 0 beds. Low acuity only. Negative covid.  [phone]   [facility] is posting 0 bed. Low acuity. No current aggression. [phone]   [facility] ([place]) is posting 3 beds available. Negative covid.  [phone].      [facility] is posting 0 bed. Low acuity. 72 HH hold preferred. Negative covid required. [phone] Per call at 7:37am to [place] CLOSED until 1/8/24.  [facility] ([place]) is posting 5 beds. Low acuity only. Negative covid.  [phone]   [facility] [place] is posting 7 beds.  Negative covid required.   Vol only, No history of aggression, violence, or assault. No sexual offenders. No 72 HH holds. [phone]      [facility] is posting 7 beds. Negative covid required.  (Must have the cognitive ability to do programming. No aggressive or violent behavior or recent HX in the last 2 yrs. MH must be primary.) Always low acuity.  [phone]   [facility] has 0 beds posted. Negative covid required.  Low acuity only. Violence and aggression capped.  [phone]   [facility] is posting 0 beds. Low acuity, Negative covid required. [phone]   [facility] is posting 7 beds. Unit is a combined unit (14+). No aggressive patients. Voluntary only. Must be accompanied by a guardian.  Negative covid. [phone]   [facility] posting 0 beds Negative covid required.  [phone]   [facility] [place] is posting 2 beds. Negative covid. LOW acuity. (No lines, drains, or tubes, oxygen, CPAP, IV, etc.) Must Have a Ride Home. [phone]   [facility] is posting 5 beds. Negative covid. (No. lines, drains, or tubes, oxygen, CPAP, IV, etc.). [phone]      Pt remains on the work list pending appropriate bed availability.                Pt is covid+

## 2024-01-09 VITALS
DIASTOLIC BLOOD PRESSURE: 83 MMHG | HEART RATE: 86 BPM | OXYGEN SATURATION: 100 % | SYSTOLIC BLOOD PRESSURE: 131 MMHG | RESPIRATION RATE: 18 BRPM | WEIGHT: 134.4 LBS | BODY MASS INDEX: 19.28 KG/M2 | TEMPERATURE: 97.9 F

## 2024-01-09 PROCEDURE — 250N000013 HC RX MED GY IP 250 OP 250 PS 637: Performed by: EMERGENCY MEDICINE

## 2024-01-09 RX ADMIN — OLANZAPINE 10 MG: 5 TABLET, ORALLY DISINTEGRATING ORAL at 07:22

## 2024-01-09 ASSESSMENT — ACTIVITIES OF DAILY LIVING (ADL)
ADLS_ACUITY_SCORE: 35

## 2024-01-09 NOTE — ED NOTES
RN ED Mental Health Handoff Note    Voluntary    Does patient require 1:1? No    Hold and rights been given and documented for patient: No    Is the patient in BH scrubs? Yes    Has the patient been searched? Yes    Is the 15 minute observation tool up to date? No- not on a hold    Was patient issued a welcome folder? No     Room check completed this shift: Yes    PSS3 and [place] Assessment/Reassessment this shift:    PSS-3      Date and Time Over the past 2 weeks have you felt down, depressed, or hopeless? Over the past 2 weeks have you had thoughts of killing yourself? Have you ever attempted to kill yourself? When did this last happen? User   01/06/24 1035 yes no no -- AKR          C-SSRS ([place])      Date and Time Q1 Wished to be Dead (Past Month) Q2 Suicidal Thoughts (Past Month) Q3 Suicidal Thought Method Q4 Suicidal Intent without Specific Plan Q5 Suicide Intent with Specific Plan Q6 Suicide Behavior (Lifetime) Within the Past 3 Months? RETIRED: Level of Risk per Screen Screening Not Complete User   01/07/24 0949 no no -- -- -- -- -- -- -- PJB   01/06/24 1212 no no -- -- -- -- -- -- -- YTM            Behavioral status of patient: Green    Code 21 called this shift? No    Use of restraints/seclusion this shift? No    Most recent vital signs:  Temp: 97.9  F (36.6  C)   BP: 124/77 Pulse: 83     SpO2: 100 %        Medications:  Scheduled medication compliance? N/a    PRN Meds administered this shift? No    Medications   OLANZapine zydis (zyPREXA) ODT tab 10 mg (10 mg Oral $Given 1/8/24 2100)   risperiDONE (risperDAL) tablet 2 mg (2 mg Oral $Given 1/8/24 2100)   OLANZapine zydis (zyPREXA) ODT tab 10 mg (10 mg Oral $Given 1/6/24 1100)   OLANZapine zydis (zyPREXA) ODT tab 10 mg (10 mg Oral $Given 1/6/24 2117)   benzocaine-menthol (CHLORASEPTIC) 6-10 MG lozenge 1 lozenge (1 lozenge Buccal $Given 1/7/24 7272)   risperiDONE (risperDAL) tablet 1 mg (1 mg Oral $Given 1/7/24 [facility])         ADLs    Meal Provided this  shift? Yes breakfast tray ordered at 0600    Hygiene items provided? Yes    ADLs completed? Yes    Date of last shower: 1/8, would like a shower in AM if does not go home right away    Any significant events this shift? No    Any information that would be helpful in caring for this patient?  Plan to go home with aunt in AM, confirm aunt is actually home and ready for patient priior to d/c    Family present/updated? No    Location of patient's belongings: DEC    Critical Care Minutes:  Does the patient need critical care minutes documented? No

## 2024-01-09 NOTE — ED NOTES
RN attempted to contact patient's aunt to coordinate discharge.  No answer from aunt, mailbox full, RN unable to leave message.

## 2024-01-09 NOTE — ED NOTES
RN ED Mental Health Handoff Note    Voluntary    Does patient require 1:1? No    Hold and rights been given and documented for patient: No    Is the patient in  scrubs? Yes    Has the patient been searched? Yes    Is the 15 minute observation tool up to date? Discontinued, no longer on hold    Was patient issued a welcome folder? No    Room check completed this shift: Yes    PSS3 and [place] Assessment/Reassessment this shift:    PSS-3      Date and Time Over the past 2 weeks have you felt down, depressed, or hopeless? Over the past 2 weeks have you had thoughts of killing yourself? Have you ever attempted to kill yourself? When did this last happen? User   01/06/24 1035 yes no no -- AKR          C-SSRS ([place])      Date and Time Q1 Wished to be Dead (Past Month) Q2 Suicidal Thoughts (Past Month) Q3 Suicidal Thought Method Q4 Suicidal Intent without Specific Plan Q5 Suicide Intent with Specific Plan Q6 Suicide Behavior (Lifetime) Within the Past 3 Months? RETIRED: Level of Risk per Screen Screening Not Complete User   01/07/24 0949 no no -- -- -- -- -- -- -- PJB   01/06/24 1212 no no -- -- -- -- -- -- -- YTM            Behavioral status of patient: Green    Code 21 called this shift? No    Use of restraints/seclusion this shift? No    Most recent vital signs:                       Medications:  Scheduled medication compliance? Yes    PRN Meds administered this shift? No    Medications   OLANZapine zydis (zyPREXA) ODT tab 10 mg (10 mg Oral $Given 1/8/24 0748)   risperiDONE (risperDAL) tablet 2 mg (2 mg Oral Not Given 1/7/24 2136)   OLANZapine zydis (zyPREXA) ODT tab 10 mg (10 mg Oral $Given 1/6/24 1100)   OLANZapine zydis (zyPREXA) ODT tab 10 mg (10 mg Oral $Given 1/6/24 2117)   benzocaine-menthol (CHLORASEPTIC) 6-10 MG lozenge 1 lozenge (1 lozenge Buccal $Given 1/7/24 5272)   risperiDONE (risperDAL) tablet 1 mg (1 mg Oral $Given 1/7/24 1640)         ADLs    Meal Provided this shift? Yes    Hygiene items  provided? Yes    ADLs completed? Yes    Date of last shower: 1/8    Any significant events this shift? No    Any information that would be helpful in caring for this patient?  Cleared from psych standpoint, no longer on hold. Ride from aunt expected 1/9 morning    Family present/updated? No    Location of patient's belongings: DEC closet    Critical Care Minutes:  Does the patient need critical care minutes documented? No